# Patient Record
Sex: FEMALE | Race: WHITE | Employment: OTHER | URBAN - METROPOLITAN AREA
[De-identification: names, ages, dates, MRNs, and addresses within clinical notes are randomized per-mention and may not be internally consistent; named-entity substitution may affect disease eponyms.]

---

## 2022-04-29 ENCOUNTER — HOSPITAL ENCOUNTER (OUTPATIENT)
Dept: MAMMOGRAPHY | Age: 82
Discharge: HOME | End: 2022-04-29
Payer: MEDICARE

## 2022-04-29 VITALS — HEIGHT: 63 IN | WEIGHT: 169 LBS | BODY MASS INDEX: 29.95 KG/M2

## 2022-04-29 DIAGNOSIS — Z12.31 VISIT FOR SCREENING MAMMOGRAM: ICD-10-CM

## 2022-04-29 PROCEDURE — 77063 BREAST TOMOSYNTHESIS BI: CPT

## 2022-05-06 ENCOUNTER — APPOINTMENT (OUTPATIENT)
Dept: ULTRASOUND IMAGING | Age: 82
End: 2022-05-06
Payer: MEDICARE

## 2022-05-06 ENCOUNTER — HOSPITAL ENCOUNTER (OUTPATIENT)
Dept: MAMMOGRAPHY | Age: 82
Discharge: HOME | End: 2022-05-06
Payer: MEDICARE

## 2022-05-06 DIAGNOSIS — R92.8 ABNORMAL FINDING ON MAMMOGRAPHY: ICD-10-CM

## 2022-05-06 PROCEDURE — G0279 TOMOSYNTHESIS, MAMMO: HCPCS

## 2022-06-03 ENCOUNTER — HOSPITAL ENCOUNTER (OUTPATIENT)
Dept: MAMMOGRAPHY | Age: 82
Discharge: HOME | End: 2022-06-03
Payer: MEDICARE

## 2022-06-03 ENCOUNTER — HOSPITAL ENCOUNTER (OUTPATIENT)
Dept: ULTRASOUND IMAGING | Age: 82
Discharge: HOME | End: 2022-06-03
Payer: MEDICARE

## 2022-06-03 DIAGNOSIS — R92.8 ABNORMAL MAMMOGRAM: ICD-10-CM

## 2022-06-03 DIAGNOSIS — R92.8 ABNORMAL MAMMOGRAM OF RIGHT BREAST: ICD-10-CM

## 2022-06-03 PROCEDURE — 88360 TUMOR IMMUNOHISTOCHEM/MANUAL: CPT

## 2022-06-03 PROCEDURE — 77065 DX MAMMO INCL CAD UNI: CPT

## 2022-06-03 PROCEDURE — 76642 ULTRASOUND BREAST LIMITED: CPT

## 2022-06-03 PROCEDURE — 88341 IMHCHEM/IMCYTCHM EA ADD ANTB: CPT

## 2022-06-03 PROCEDURE — 88305 TISSUE EXAM BY PATHOLOGIST: CPT

## 2022-06-03 PROCEDURE — 88342 IMHCHEM/IMCYTCHM 1ST ANTB: CPT

## 2022-06-03 PROCEDURE — 2709999900 US BREAST BIOPSY W LOC DEVICE 1ST LESION RIGHT

## 2022-07-06 NOTE — PROGRESS NOTES
Place patient label inside box (if no patient label, complete below)  Name:  :  MR#:   Gita Delgado / PROCEDURE  1. I (we), Augusta Jaramillo (Patient Name) authorize Nikita Pedersen MD (Provider / Larry Dominguez) and/or such assistants as may be selected by him/her, to perform the following operation/procedure(s): RIGHT BREAST LUMPECTOMY/ RIGHT BREAST SENTINEL NODE BIOPSY       Note: If unable to obtain consent prior to an emergent procedure, document the emergent reason in the medical record. This procedure has been explained to my (our) satisfaction and included in the explanation was:  A) The intended benefit, nature, and extent of the procedure to be performed;  B) The significant risks involved and the probability of success;  C) Alternative procedures and methods of treatment;  D) The dangers and probable consequences of such alternatives (including no procedure or treatment); E) The expected consequences of the procedure on my future health;  F) Whether other qualified individuals would be performing important surgical tasks and/or whether  would be present to advise or support the procedure. I (we) understand that there are other risks of infection and other serious complications in the pre-operative/procedural and postoperative/procedural stages of my (our) care. I (we) have asked all of the questions which I (we) thought were important in deciding whether or not to undergo treatment or diagnosis. These questions have been answered to my (our) satisfaction. I (we) understand that no assurance can be given that the procedure will be a success, and no guarantee or warranty of success has been given to me (us).     2. It has been explained to me (us) that during the course of the operation/procedure, unforeseen conditions may be revealed that necessitate extension of the original procedure(s) or different procedure(s) than those set forth in Paragraph 1. I (we) authorize and request that the above-named physician, his/her assistants or his/her designees, perform procedures as necessary and desirable if deemed to be in my (our) best interest.     Revised 8/2/2021                                                                          Page 1 of 2                   3. I acknowledge that health care personnel may be observing this procedure for the purpose of medical education or other specified purposes as may be necessary as requested and/or approved by my (our) physician. 4. I (we) consent to the disposal by the hospital Pathologist of the removed tissue, parts or organs in accordance with hospital policy. 5. I do ____ do not ____ consent to the use of a local infiltration pain blocking agent that will be used by my provider/surgical provider to help alleviate pain during my procedure. 6. I do ____ do not ____ consent to an emergent blood transfusion in the case of a life-threatening situation that requires blood components to be administered. This consent is valid for 24 hours from the beginning of the procedure. 7. This patient does ____ or does not ____ currently have a DNR status/order. If DNR order is in place, obtain Addendum to the Surgical Consent for ALL Patients with a DNR Order to address michael-operative status for limited intervention or DNR suspension.      8. I have read and fully understand the above Consent for Operation/Procedure and that all blanks were completed before I signed the consent.   _____________________________       _____________________      ____/____am/pm  Signature of Patient or legal representative      Printed Name / Relationship            Date / Time   ____________________________       _____________________      ____/____am/pm  Witness to Signature                                    Printed Name                    Date / Time     If patient is unable to sign or is a minor, complete the following)  Patient is a minor, ____ years of age, or unable to sign because:   ______________________________________________________________________________________________    Bazan Hedger If a phone consent is obtained, consent will be documented by using two health care professionals, each affirming that the consenting party has no questions and gives consent for the procedure discussed with the physician/provider.   _____________________          ____________________       _____/_____am/pm   2nd witness to phone consent        Printed name           Date / Time    Informed Consent:  I have provided the explanation described above in section 1 to the patient and/or legal representative.  I have provided the patient and/or legal representative with an opportunity to ask any questions about the proposed operation/procedure.   ___________________________          ____________________         ____/____am/pm  Provider / Proceduralist                            Printed name            Date / Time  Revised 8/2/2021                                                                      Page 2 of 2

## 2022-07-07 NOTE — PROGRESS NOTES
body piercings), make-up, fingernail polish, lotion, powders or metal hairclips. 15. Contacts will need to be removed prior to surgery. You may want to bring your eye glasses to wear immediately before and after surgery. 14. Dentures will need to be removed before your procedure. 13. Bring cases for your glasses, contacts, dentures, or hearing aids to protect them while you are in surgery. 16. If you use a CPAP, please bring it with you on the day of your procedure. 17. Do not shave or wax for 72 hours prior to procedure near your operative site  18. FOR WOMAN OF CHILDBEARING AGE ONLY- please make sure we can collect a urine sample on arrival.     If you have further questions, you may contact your surgeon's office or us at [phone]     Left instructions on patient's voicemail.     Jennifer James RN.7/7/2022 .9:41 AM

## 2022-07-11 ENCOUNTER — ANESTHESIA EVENT (OUTPATIENT)
Dept: OPERATING ROOM | Age: 82
End: 2022-07-11
Payer: MEDICARE

## 2022-07-12 ENCOUNTER — HOSPITAL ENCOUNTER (OUTPATIENT)
Dept: NUCLEAR MEDICINE | Age: 82
Discharge: HOME | End: 2022-07-12
Payer: MEDICARE

## 2022-07-12 ENCOUNTER — HOSPITAL ENCOUNTER (OUTPATIENT)
Dept: MAMMOGRAPHY | Age: 82
Discharge: HOME | End: 2022-07-12
Payer: MEDICARE

## 2022-07-12 ENCOUNTER — HOSPITAL ENCOUNTER (OUTPATIENT)
Age: 82
Setting detail: OUTPATIENT SURGERY
Discharge: HOME | End: 2022-07-12
Attending: SURGERY | Admitting: SURGERY
Payer: MEDICARE

## 2022-07-12 ENCOUNTER — ANESTHESIA (OUTPATIENT)
Dept: OPERATING ROOM | Age: 82
End: 2022-07-12
Payer: MEDICARE

## 2022-07-12 VITALS
SYSTOLIC BLOOD PRESSURE: 156 MMHG | WEIGHT: 165 LBS | TEMPERATURE: 97 F | OXYGEN SATURATION: 94 % | BODY MASS INDEX: 29.23 KG/M2 | HEART RATE: 66 BPM | HEIGHT: 63 IN | RESPIRATION RATE: 14 BRPM | DIASTOLIC BLOOD PRESSURE: 76 MMHG

## 2022-07-12 DIAGNOSIS — R92.8 ABNORMAL MAMMOGRAM: ICD-10-CM

## 2022-07-12 DIAGNOSIS — C50.411 MALIGNANT NEOPLASM OF UPPER-OUTER QUADRANT OF RIGHT FEMALE BREAST, UNSPECIFIED ESTROGEN RECEPTOR STATUS (HCC): ICD-10-CM

## 2022-07-12 DIAGNOSIS — G89.18 ACUTE POST-OPERATIVE PAIN: Primary | ICD-10-CM

## 2022-07-12 LAB
GLUCOSE BLD-MCNC: 114 MG/DL (ref 70–99)
GLUCOSE BLD-MCNC: 136 MG/DL (ref 70–99)
PERFORMED ON: ABNORMAL
PERFORMED ON: ABNORMAL

## 2022-07-12 PROCEDURE — 2500000003 HC RX 250 WO HCPCS: Performed by: NURSE ANESTHETIST, CERTIFIED REGISTERED

## 2022-07-12 PROCEDURE — 6360000002 HC RX W HCPCS: Performed by: SURGERY

## 2022-07-12 PROCEDURE — A9520 TC99 TILMANOCEPT DIAG 0.5MCI: HCPCS | Performed by: SURGERY

## 2022-07-12 PROCEDURE — 88342 IMHCHEM/IMCYTCHM 1ST ANTB: CPT

## 2022-07-12 PROCEDURE — 3600000014 HC SURGERY LEVEL 4 ADDTL 15MIN: Performed by: SURGERY

## 2022-07-12 PROCEDURE — 76098 X-RAY EXAM SURGICAL SPECIMEN: CPT

## 2022-07-12 PROCEDURE — 7100000011 HC PHASE II RECOVERY - ADDTL 15 MIN: Performed by: SURGERY

## 2022-07-12 PROCEDURE — 3430000000 HC RX DIAGNOSTIC RADIOPHARMACEUTICAL: Performed by: SURGERY

## 2022-07-12 PROCEDURE — 88331 PATH CONSLTJ SURG 1 BLK 1SPC: CPT

## 2022-07-12 PROCEDURE — 6370000000 HC RX 637 (ALT 250 FOR IP): Performed by: ANESTHESIOLOGY

## 2022-07-12 PROCEDURE — 3430000000 HC RX DIAGNOSTIC RADIOPHARMACEUTICAL

## 2022-07-12 PROCEDURE — 6360000002 HC RX W HCPCS: Performed by: ANESTHESIOLOGY

## 2022-07-12 PROCEDURE — 78195 LYMPH SYSTEM IMAGING: CPT

## 2022-07-12 PROCEDURE — 2580000003 HC RX 258: Performed by: ANESTHESIOLOGY

## 2022-07-12 PROCEDURE — 6360000002 HC RX W HCPCS: Performed by: NURSE ANESTHETIST, CERTIFIED REGISTERED

## 2022-07-12 PROCEDURE — 7100000000 HC PACU RECOVERY - FIRST 15 MIN: Performed by: SURGERY

## 2022-07-12 PROCEDURE — 2580000003 HC RX 258: Performed by: SURGERY

## 2022-07-12 PROCEDURE — A9520 TC99 TILMANOCEPT DIAG 0.5MCI: HCPCS

## 2022-07-12 PROCEDURE — 88341 IMHCHEM/IMCYTCHM EA ADD ANTB: CPT

## 2022-07-12 PROCEDURE — 88307 TISSUE EXAM BY PATHOLOGIST: CPT

## 2022-07-12 PROCEDURE — 88360 TUMOR IMMUNOHISTOCHEM/MANUAL: CPT

## 2022-07-12 PROCEDURE — 2500000003 HC RX 250 WO HCPCS: Performed by: SURGERY

## 2022-07-12 PROCEDURE — 88305 TISSUE EXAM BY PATHOLOGIST: CPT

## 2022-07-12 PROCEDURE — 6370000000 HC RX 637 (ALT 250 FOR IP): Performed by: SURGERY

## 2022-07-12 PROCEDURE — A4217 STERILE WATER/SALINE, 500 ML: HCPCS | Performed by: SURGERY

## 2022-07-12 PROCEDURE — 7100000010 HC PHASE II RECOVERY - FIRST 15 MIN: Performed by: SURGERY

## 2022-07-12 PROCEDURE — 2709999900 HC NON-CHARGEABLE SUPPLY: Performed by: SURGERY

## 2022-07-12 PROCEDURE — 7100000001 HC PACU RECOVERY - ADDTL 15 MIN: Performed by: SURGERY

## 2022-07-12 PROCEDURE — 3700000000 HC ANESTHESIA ATTENDED CARE: Performed by: SURGERY

## 2022-07-12 PROCEDURE — 3600000004 HC SURGERY LEVEL 4 BASE: Performed by: SURGERY

## 2022-07-12 PROCEDURE — 3700000001 HC ADD 15 MINUTES (ANESTHESIA): Performed by: SURGERY

## 2022-07-12 RX ORDER — MAGNESIUM HYDROXIDE 1200 MG/15ML
LIQUID ORAL CONTINUOUS PRN
Status: DISCONTINUED | OUTPATIENT
Start: 2022-07-12 | End: 2022-07-12 | Stop reason: HOSPADM

## 2022-07-12 RX ORDER — SODIUM CHLORIDE 9 MG/ML
INJECTION, SOLUTION INTRAVENOUS PRN
Status: DISCONTINUED | OUTPATIENT
Start: 2022-07-12 | End: 2022-07-12 | Stop reason: HOSPADM

## 2022-07-12 RX ORDER — OXYCODONE HYDROCHLORIDE 5 MG/1
5 TABLET ORAL PRN
Status: COMPLETED | OUTPATIENT
Start: 2022-07-12 | End: 2022-07-12

## 2022-07-12 RX ORDER — FENTANYL CITRATE 50 UG/ML
50 INJECTION, SOLUTION INTRAMUSCULAR; INTRAVENOUS EVERY 5 MIN PRN
Status: DISCONTINUED | OUTPATIENT
Start: 2022-07-12 | End: 2022-07-12 | Stop reason: HOSPADM

## 2022-07-12 RX ORDER — ISOSULFAN BLUE 50 MG/5ML
INJECTION, SOLUTION SUBCUTANEOUS PRN
Status: DISCONTINUED | OUTPATIENT
Start: 2022-07-12 | End: 2022-07-12 | Stop reason: HOSPADM

## 2022-07-12 RX ORDER — CARVEDILOL 3.12 MG/1
3.12 TABLET ORAL 2 TIMES DAILY
COMMUNITY
Start: 2022-07-07

## 2022-07-12 RX ORDER — ONDANSETRON 2 MG/ML
INJECTION INTRAMUSCULAR; INTRAVENOUS PRN
Status: DISCONTINUED | OUTPATIENT
Start: 2022-07-12 | End: 2022-07-12 | Stop reason: SDUPTHER

## 2022-07-12 RX ORDER — MEPERIDINE HYDROCHLORIDE 25 MG/ML
6.25 INJECTION INTRAMUSCULAR; INTRAVENOUS; SUBCUTANEOUS ONCE
Status: COMPLETED | OUTPATIENT
Start: 2022-07-12 | End: 2022-07-12

## 2022-07-12 RX ORDER — LABETALOL HYDROCHLORIDE 5 MG/ML
10 INJECTION, SOLUTION INTRAVENOUS
Status: DISCONTINUED | OUTPATIENT
Start: 2022-07-12 | End: 2022-07-12 | Stop reason: HOSPADM

## 2022-07-12 RX ORDER — HYDROCODONE BITARTRATE AND ACETAMINOPHEN 5; 325 MG/1; MG/1
1 TABLET ORAL EVERY 6 HOURS PRN
Qty: 20 TABLET | Refills: 0 | Status: SHIPPED | OUTPATIENT
Start: 2022-07-12 | End: 2022-07-17

## 2022-07-12 RX ORDER — SODIUM CHLORIDE 0.9 % (FLUSH) 0.9 %
5-40 SYRINGE (ML) INJECTION PRN
Status: DISCONTINUED | OUTPATIENT
Start: 2022-07-12 | End: 2022-07-12 | Stop reason: HOSPADM

## 2022-07-12 RX ORDER — WARFARIN SODIUM 2 MG/1
2 TABLET ORAL DAILY
COMMUNITY
Start: 2022-07-07

## 2022-07-12 RX ORDER — LABETALOL HYDROCHLORIDE 5 MG/ML
INJECTION, SOLUTION INTRAVENOUS PRN
Status: DISCONTINUED | OUTPATIENT
Start: 2022-07-12 | End: 2022-07-12 | Stop reason: SDUPTHER

## 2022-07-12 RX ORDER — FENTANYL CITRATE 50 UG/ML
INJECTION, SOLUTION INTRAMUSCULAR; INTRAVENOUS PRN
Status: DISCONTINUED | OUTPATIENT
Start: 2022-07-12 | End: 2022-07-12 | Stop reason: SDUPTHER

## 2022-07-12 RX ORDER — AMIODARONE HYDROCHLORIDE 200 MG/1
200 TABLET ORAL DAILY
COMMUNITY

## 2022-07-12 RX ORDER — OXYCODONE HYDROCHLORIDE 5 MG/1
10 TABLET ORAL PRN
Status: COMPLETED | OUTPATIENT
Start: 2022-07-12 | End: 2022-07-12

## 2022-07-12 RX ORDER — SODIUM CHLORIDE 0.9 % (FLUSH) 0.9 %
5-40 SYRINGE (ML) INJECTION EVERY 12 HOURS SCHEDULED
Status: DISCONTINUED | OUTPATIENT
Start: 2022-07-12 | End: 2022-07-12 | Stop reason: HOSPADM

## 2022-07-12 RX ORDER — FUROSEMIDE 20 MG/1
20 TABLET ORAL DAILY
COMMUNITY
Start: 2022-07-07

## 2022-07-12 RX ORDER — GLYCOPYRROLATE 0.2 MG/ML
INJECTION INTRAMUSCULAR; INTRAVENOUS PRN
Status: DISCONTINUED | OUTPATIENT
Start: 2022-07-12 | End: 2022-07-12 | Stop reason: SDUPTHER

## 2022-07-12 RX ORDER — IPRATROPIUM BROMIDE AND ALBUTEROL SULFATE 2.5; .5 MG/3ML; MG/3ML
1 SOLUTION RESPIRATORY (INHALATION)
Status: DISCONTINUED | OUTPATIENT
Start: 2022-07-12 | End: 2022-07-12 | Stop reason: HOSPADM

## 2022-07-12 RX ORDER — LIDOCAINE HYDROCHLORIDE 20 MG/ML
INJECTION, SOLUTION EPIDURAL; INFILTRATION; INTRACAUDAL; PERINEURAL PRN
Status: DISCONTINUED | OUTPATIENT
Start: 2022-07-12 | End: 2022-07-12 | Stop reason: SDUPTHER

## 2022-07-12 RX ORDER — ONDANSETRON 2 MG/ML
4 INJECTION INTRAMUSCULAR; INTRAVENOUS
Status: DISCONTINUED | OUTPATIENT
Start: 2022-07-12 | End: 2022-07-12 | Stop reason: HOSPADM

## 2022-07-12 RX ORDER — SODIUM CHLORIDE 9 MG/ML
25 INJECTION, SOLUTION INTRAVENOUS PRN
Status: DISCONTINUED | OUTPATIENT
Start: 2022-07-12 | End: 2022-07-12 | Stop reason: HOSPADM

## 2022-07-12 RX ORDER — METOCLOPRAMIDE HYDROCHLORIDE 5 MG/ML
10 INJECTION INTRAMUSCULAR; INTRAVENOUS
Status: DISCONTINUED | OUTPATIENT
Start: 2022-07-12 | End: 2022-07-12 | Stop reason: HOSPADM

## 2022-07-12 RX ORDER — PROPOFOL 10 MG/ML
INJECTION, EMULSION INTRAVENOUS PRN
Status: DISCONTINUED | OUTPATIENT
Start: 2022-07-12 | End: 2022-07-12 | Stop reason: SDUPTHER

## 2022-07-12 RX ORDER — LOSARTAN POTASSIUM 50 MG/1
50 TABLET ORAL DAILY
COMMUNITY

## 2022-07-12 RX ORDER — ATORVASTATIN CALCIUM 10 MG/1
10 TABLET, FILM COATED ORAL DAILY
COMMUNITY
Start: 2022-06-21

## 2022-07-12 RX ORDER — SODIUM CHLORIDE, SODIUM LACTATE, POTASSIUM CHLORIDE, CALCIUM CHLORIDE 600; 310; 30; 20 MG/100ML; MG/100ML; MG/100ML; MG/100ML
INJECTION, SOLUTION INTRAVENOUS CONTINUOUS
Status: DISCONTINUED | OUTPATIENT
Start: 2022-07-12 | End: 2022-07-12 | Stop reason: HOSPADM

## 2022-07-12 RX ORDER — MEPERIDINE HYDROCHLORIDE 25 MG/ML
6.25 INJECTION INTRAMUSCULAR; INTRAVENOUS; SUBCUTANEOUS ONCE
Status: DISCONTINUED | OUTPATIENT
Start: 2022-07-12 | End: 2022-07-12 | Stop reason: HOSPADM

## 2022-07-12 RX ORDER — MIDAZOLAM HYDROCHLORIDE 1 MG/ML
INJECTION INTRAMUSCULAR; INTRAVENOUS PRN
Status: DISCONTINUED | OUTPATIENT
Start: 2022-07-12 | End: 2022-07-12 | Stop reason: SDUPTHER

## 2022-07-12 RX ADMIN — LABETALOL HYDROCHLORIDE 10 MG: 5 INJECTION, SOLUTION INTRAVENOUS at 11:19

## 2022-07-12 RX ADMIN — PHENYLEPHRINE HYDROCHLORIDE 300 MCG: 10 INJECTION, SOLUTION INTRAMUSCULAR; INTRAVENOUS; SUBCUTANEOUS at 10:10

## 2022-07-12 RX ADMIN — SODIUM CHLORIDE, POTASSIUM CHLORIDE, SODIUM LACTATE AND CALCIUM CHLORIDE: 600; 310; 30; 20 INJECTION, SOLUTION INTRAVENOUS at 08:58

## 2022-07-12 RX ADMIN — FENTANYL CITRATE 50 MCG: 50 INJECTION, SOLUTION INTRAMUSCULAR; INTRAVENOUS at 10:01

## 2022-07-12 RX ADMIN — PHENYLEPHRINE HYDROCHLORIDE 200 MCG: 10 INJECTION, SOLUTION INTRAMUSCULAR; INTRAVENOUS; SUBCUTANEOUS at 10:08

## 2022-07-12 RX ADMIN — FENTANYL CITRATE 50 MCG: 50 INJECTION, SOLUTION INTRAMUSCULAR; INTRAVENOUS at 11:04

## 2022-07-12 RX ADMIN — LIDOCAINE HYDROCHLORIDE 50 MG: 20 INJECTION, SOLUTION EPIDURAL; INFILTRATION; INTRACAUDAL; PERINEURAL at 10:01

## 2022-07-12 RX ADMIN — TILMANOCEPT 0.8 MILLICURIE: KIT at 10:18

## 2022-07-12 RX ADMIN — GLYCOPYRROLATE 0.2 MG: 0.2 INJECTION INTRAMUSCULAR; INTRAVENOUS at 10:08

## 2022-07-12 RX ADMIN — MIDAZOLAM HYDROCHLORIDE 1 MG: 2 INJECTION, SOLUTION INTRAMUSCULAR; INTRAVENOUS at 09:53

## 2022-07-12 RX ADMIN — MIDAZOLAM HYDROCHLORIDE 1 MG: 2 INJECTION, SOLUTION INTRAMUSCULAR; INTRAVENOUS at 09:58

## 2022-07-12 RX ADMIN — ONDANSETRON 4 MG: 2 INJECTION INTRAMUSCULAR; INTRAVENOUS at 11:37

## 2022-07-12 RX ADMIN — CEFAZOLIN 2000 MG: 2 INJECTION, POWDER, FOR SOLUTION INTRAMUSCULAR; INTRAVENOUS at 10:05

## 2022-07-12 RX ADMIN — OXYCODONE 5 MG: 5 TABLET ORAL at 14:15

## 2022-07-12 RX ADMIN — MEPERIDINE HYDROCHLORIDE 6.25 MG: 25 INJECTION INTRAMUSCULAR; INTRAVENOUS; SUBCUTANEOUS at 12:45

## 2022-07-12 RX ADMIN — PROPOFOL 100 MG: 10 INJECTION, EMULSION INTRAVENOUS at 10:01

## 2022-07-12 ASSESSMENT — PAIN DESCRIPTION - LOCATION: LOCATION: BREAST

## 2022-07-12 ASSESSMENT — PAIN SCALES - GENERAL
PAINLEVEL_OUTOF10: 0
PAINLEVEL_OUTOF10: 0
PAINLEVEL_OUTOF10: 5

## 2022-07-12 ASSESSMENT — PAIN - FUNCTIONAL ASSESSMENT: PAIN_FUNCTIONAL_ASSESSMENT: 0-10

## 2022-07-12 ASSESSMENT — PAIN DESCRIPTION - ORIENTATION: ORIENTATION: RIGHT

## 2022-07-12 ASSESSMENT — LIFESTYLE VARIABLES: SMOKING_STATUS: 0

## 2022-07-12 ASSESSMENT — PAIN DESCRIPTION - DESCRIPTORS: DESCRIPTORS: ACHING

## 2022-07-12 NOTE — PROGRESS NOTES
Patient admitted to PACU # 09 from OR at 1215 post RIGHT BREAST LUMPECTOMY/ RIGHT BREAST SENTINEL NODE BIOPSY - Right   per Dr. Robin Drake. Attached to PACU monitoring system and report received from anesthesia provider. Patient was reported to be hemodynamically stable during procedure. Patient drowsy on admission and denied pain. Pt R surgical incision with surgical glue and is c/d/i. Pt on simple mask at 6 L. Pt NSR on monitor. Blood glucose 114. Will continue to monitor.

## 2022-07-12 NOTE — PROGRESS NOTES
Ambulatory Surgery/Procedure Discharge Note    Vitals:    07/12/22 1339   BP: (!) 156/76   Pulse: 66   Resp: 14   Temp: 97 °F (36.1 °C)   SpO2: 94%       In: 1250 [I.V.:1200]  Out: -     Restroom use offered before discharge. yes    Pain assessment:  {Pain Level: 5    Pain pill given on discharge. Patient discharged to home/self care.  Patient discharged via wheel chair by transporter to waiting family/S.O.       7/12/2022 4:12 PM

## 2022-07-12 NOTE — PROGRESS NOTES
PACU Transfer to \[facility]\""    Vitals:    07/12/22 1310   BP: (!) 168/77   Pulse: 67   Resp: 18   Temp: 98.2 °F (36.8 °C)   SpO2: 92%         Intake/Output Summary (Last 24 hours) at 7/12/2022 1330  Last data filed at 7/12/2022 1206  Gross per 24 hour   Intake 1250 ml   Output --   Net 1250 ml       Pain assessment:  level of pain (1-10, 10 severe),   Pain Level: 0    Patient transferred to care of \[facility]\"" RN.    7/12/2022 1:30 PM         Patients spo2 92% on ear probe, patient states she tremors when she is nervous, which she states she is when in hospitals. Per  [facility], pt ok to discharge with systolic BP under 260.

## 2022-07-12 NOTE — BRIEF OP NOTE
Brief Postoperative Note      Patient: Mirella Samuel  YOB: 1940  MRN: [id number]    Date of Procedure: 7/12/2022    Pre-Op Diagnosis: Malignant neoplasm of upper-outer quadrant of right female breast, unspecified estrogen receptor status ([facility] [address] [C50.411]    Post-Op Diagnosis: Same       Procedure(s):  RIGHT BREAST LUMPECTOMY/ RIGHT BREAST SENTINEL NODE BIOPSY    Surgeon(s):  Ellender Apley, MD    Assistant:  Surgical Assistant: Jeana Sharma    Anesthesia: General    Estimated Blood Loss (mL): less than 50     Complications: None    Specimens:   ID Type Source Tests Collected by Time Destination   A : A) SENTINEL NODE #1 RIGHT AXILLA, BLUE, LEVEL 1 (09836) Tissue Tissue SURGICAL PATHOLOGY Ellender Apley, MD 7/12/2022 1044    B : B) RIGHT AXILLARY NODE Tissue Tissue SURGICAL PATHOLOGY Ellender Apley, MD 7/12/2022 1047    C : C) RIGHT BREAST MASS Tissue Tissue SURGICAL PATHOLOGY Ellender Apley, MD 7/12/2022 1114        Implants:  * No implants in log *      Drains:   Closed/Suction Drain Right Breast Bulb (Active)   Site Description Clean, dry & intact 07/12/22 1141   Dressing Status New dressing applied 07/12/22 1141   Drainage Appearance Bloody 07/12/22 1141   Drain Status Compressed 07/12/22 1141       Findings: sentinel node negative for malignancy, radiograph demonstrates pleomorphic calcifications, biopsy marker and grossly negative margins    Electronically signed by Ellender Apley, MD on 7/12/2022 at 12:20 PM

## 2022-07-12 NOTE — ANESTHESIA PRE PROCEDURE
Allergies:  No Known Allergies    Problem List:  There is no problem list on file for this patient. Past Medical History:  History reviewed. No pertinent past medical history. Past Surgical History:        Procedure Laterality Date    HYSTERECTOMY (CERVIX STATUS UNKNOWN)      US BREAST NEEDLE BIOPSY RIGHT Right 6/3/2022    US BREAST NEEDLE BIOPSY RIGHT 6/3/2022 Julio C Ray MD [facility] MOB ULTRASOUND       Social History:    Social History     Tobacco Use    Smoking status: Never Smoker    Smokeless tobacco: Never Used   Substance Use Topics    Alcohol use: Not on file                                Counseling given: Not Answered      Vital Signs (Current):   Vitals:    07/12/22 0815   BP: (!) 168/88   Pulse: 85   Resp: 14   Temp: 97.3 °F (36.3 °C)   TempSrc: Temporal   SpO2: 95%   Weight: 165 lb (74.8 kg)   Height: 5' 3\" (1.6 m)                                              BP Readings from Last 3 Encounters:   07/12/22 (!) 168/88       NPO Status:                                                                                 BMI:   Wt Readings from Last 3 Encounters:   07/12/22 165 lb (74.8 kg)   04/29/22 169 lb (76.7 kg)     Body mass index is 29.23 kg/m².     CBC:   Lab Results   Component Value Date/Time    WBC 4.8 09/09/2016 12:00 PM    RBC 4.70 09/09/2016 12:00 PM    HGB 13.1 09/09/2016 12:00 PM    HCT 38 09/09/2016 12:00 PM    MCV 81 09/09/2016 12:00 PM    RDW 13.7 09/09/2016 12:00 PM     09/09/2016 12:00 PM       CMP:   Lab Results   Component Value Date/Time     09/09/2016 12:00 PM    K 3.7 09/09/2016 12:00 PM     09/09/2016 12:00 PM    CO2 28 09/09/2016 12:00 PM    BUN 18 09/09/2016 12:00 PM    CREATININE 0.94 09/09/2016 12:00 PM    GFRAA >60 09/09/2016 12:00 PM    LABGLOM 58 09/09/2016 12:00 PM    GLUCOSE 132 09/09/2016 12:00 PM    PROT 7.3 09/09/2016 12:00 PM    CALCIUM 8.8 09/09/2016 12:00 PM    BILITOT 0.5 09/09/2016 12:00 PM    ALKPHOS 57 09/09/2016 12:00 PM AST 16 09/09/2016 12:00 PM    ALT 14 09/09/2016 12:00 PM       POC Tests:   Recent Labs     07/12/22  0837   POCGLU 136*       Coags: No results found for: PROTIME, INR, APTT    HCG (If Applicable): No results found for: PREGTESTUR, PREGSERUM, HCG, HCGQUANT     ABGs: No results found for: PHART, PO2ART, IVN7IDV, QRG4BJN, BEART, C1AUFDMF     Type & Screen (If Applicable):  No results found for: LABABO, LABRH    Drug/Infectious Status (If Applicable):  No results found for: HIV, HEPCAB    COVID-19 Screening (If Applicable): No results found for: COVID19        Anesthesia Evaluation    Airway: Mallampati: II  TM distance: >3 FB   Neck ROM: full  Mouth opening: > = 3 FB   Dental:          Pulmonary: breath sounds clear to auscultation      (-) COPD, asthma, sleep apnea and not a current smoker                           Cardiovascular:    (+) hypertension:, dysrhythmias: atrial fibrillation, hyperlipidemia    (-) past MI, CAD, CABG/stent and  angina    ECG reviewed  Rhythm: regular  Rate: normal  Echocardiogram reviewed         Beta Blocker:  Dose within 24 Hrs      ROS comment: 2021: There is mild mitral regurgitation. There is mild concentric left ventricular hypertrophy. The left atrium is dilated. The left ventricular function is moderately reduced. There is moderate global hypokinesis of the left ventricle. Overall left ventricular ejection fraction is estimated to be 30-35%. A contrast agent was used to demonstrate all left ventricular wall segments       Neuro/Psych:      (-) seizures, TIA and CVA           GI/Hepatic/Renal:        (-) GERD, liver disease and no renal disease       Endo/Other:    (+) DiabetesType II DM, , malignancy/cancer (DCIS). (-) hypothyroidism, hyperthyroidism               Abdominal:             Vascular:   + DVT (on coumadin, held x 1 week now, DVT many years ago ), .  - PE.       Other Findings:           Anesthesia Plan      general     ASA 3       Induction: intravenous. MIPS: Postoperative opioids intended and Prophylactic antiemetics administered. Anesthetic plan and risks discussed with patient. Plan discussed with CRNA.                     China Sierra MD   7/12/2022

## 2022-07-12 NOTE — FLOWSHEET NOTE
Dr. Viky Parada made aware of BP ok with BP, said to notify her if SBP is above 170. Pt was htn on arrival in [facility].

## 2022-07-12 NOTE — H&P
Kiah Garduno    [id number]    [facility] Same Day Surgery Update H & P  Department of General Surgery   Surgical Service   Pre-operative History and Physical  Last H & P within the last 30 days. 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 female who was undergoing screening mammogram when she was alerted to an abnormality in the right breast. She underwent additional imaging and ultimately core biopsy which demonstrated carcinoma and the patient presents today for the above procedure. Illness Screening: Patient denies fever, chills, worsening cough, or close contact with sick individuals. Past Medical History:    History reviewed. No pertinent past medical history. Past Surgical History:        Procedure Laterality Date    HYSTERECTOMY (CERVIX STATUS UNKNOWN)      US BREAST NEEDLE BIOPSY RIGHT Right 6/3/2022    US BREAST NEEDLE BIOPSY RIGHT 6/3/2022 Victoria Saab MD [facility] MOB ULTRASOUND       Medications Prior to Admission:      Prior to Admission medications    Medication Sig Start Date End Date Taking?  Authorizing Provider   atorvastatin (LIPITOR) 10 MG tablet Take 10 mg by mouth daily 6/21/22  Yes Historical Provider, MD   furosemide (LASIX) 20 MG tablet Take 20 mg by mouth daily 7/7/22  Yes Historical Provider, MD   carvedilol (COREG) 3.125 MG tablet Take 3.125 mg by mouth in the morning and at bedtime 7/7/22   Historical Provider, MD   amiodarone (CORDARONE) 200 MG tablet Take 200 mg by mouth daily 1/2 tab daily    Historical Provider, MD   losartan (COZAAR) 50 MG tablet Take 50 mg by mouth daily    Historical Provider, MD   metFORMIN (GLUCOPHAGE) 500 MG tablet Take 500 mg by mouth daily 7/7/22   Historical Provider, MD   linagliptin (TRADJENTA) 5 MG tablet Take 5 mg by mouth daily    Historical Provider, MD   warfarin (COUMADIN) 2 MG tablet Take 2 mg by mouth daily 7/7/22   Historical Provider, MD          Allergies:  Patient has no known allergies. PHYSICAL EXAM:      BP (!) 168/88   Pulse 85   Temp 97.3 °F (36.3 °C) (Temporal)   Resp 14   Ht 5' 3\" (1.6 m)   Wt 165 lb (74.8 kg)   SpO2 95%   BMI 29.23 kg/m²      Airway:  Airway patent with no audible stridor    Heart:  Regular rate and rhythm, No murmur noted    Lungs:  No increased work of breathing, good air exchange, clear to auscultation bilaterally, no crackles or wheezing    Abdomen:  Soft, non-distended, non-tender, no rebound tenderness or guarding, and no masses palpated    ASSESSMENT AND PLAN     Patient is a 78 y.o. female with above specified procedure planned. 1.  The patients history and physical was obtained and signed off by the pre-admission testing department. Patient seen and focused exam done today- no new changes since last physical exam on 6/28/22    2. Access to ancillary services are available per request of the provider.     LORI Ma - CNP     7/12/2022

## 2022-07-12 NOTE — ANESTHESIA POSTPROCEDURE EVALUATION
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     Mitra Phase I: Mitra Score: 10    Mitra Phase II:        Anesthesia Post Evaluation    Patient location during evaluation: PACU  Level of consciousness: awake and alert  Airway patency: patent  Nausea & Vomiting: no vomiting  Complications: no  Cardiovascular status: blood pressure returned to baseline  Respiratory status: acceptable  Hydration status: euvolemic  Multimodal analgesia pain management approach

## 2022-07-12 NOTE — FLOWSHEET NOTE
Pt c/o \"shivers\" pt denies feeling cold. Dr. Peace Valenzuela came to bedside and said to give 6.52 mg of demerol. Orders placed. Dr Peace Valenzuela encouraged pt to use IS. IS given to pt and educated on how to use.

## 2022-07-14 NOTE — OP NOTE
[address], [address]                                OPERATIVE REPORT    PATIENT NAME: Kevin Vaughan                    :        1940  MED REC NO:   [id number]                          ROOM:  ACCOUNT NO:   [de-identified]                           ADMIT DATE: 2022  PROVIDER:     Solis Hoyt MD    DATE OF PROCEDURE:  2022    PREOPERATIVE DIAGNOSIS:  Right breast cancer. POSTOPERATIVE DIAGNOSIS:  Right breast cancer. PROCEDURE:  Right lumpectomy with sentinel node biopsy. SURGEON:  Solis Hoyt MD    ANESTHESIA:  General.    BLOOD LOSS:  Less than 50 mL. ASSISTANT:   Pablo Baker    INDICATIONS:  The patient is an 78-year-old woman with a large invasive  carcinoma of the upper outer right breast.  The patient was counseled  regarding the treatment options and elected to proceed with breast  conservation. DESCRIPTION OF THE PROCEDURE:  The patient was taken to the operating  room, placed in the supine position. After induction of general  anesthesia, the patient underwent periareolar injection of Lymphoseek  and subareolar injection of isosulfan blue. We created a generous  axillary incision and carried the incision in a cranial direction  towards the axillary contents. We divided the subcutaneous tissue and  clavipectoral fascia to expose a blue level 1 node with a count of  13,522. Frozen section was negative for malignancy. We then identified  a second sentinel node in the specimen, but this was neither blue nor  radioactive. Scan of the axilla demonstrated background count of 111. We palpated no abnormal nodes nor did we appreciate any other blue or  radioactive nodes. At that point, we concluded the sentinel node  biopsy. We then dissected in a inferomedial direction creating a flap  over the mass.   We then dissected the mass off the pectoralis muscle  using electrocautery. Specimen was oriented with a MarginMap and  submitted for radiograph. The radiograph confirmed the presence of the  prior biopsy marker. The malignant calcifications appeared to be  contained within the specimen with a small grossly clear margin. We  placed a piece of Gelfoam in the cavity and then closed the incision in  two layers with interrupted deep dermal sutures followed by 4-0 Vicryl,  4-0 Monocryl subcuticular skin closure. A 15 round Sudhir Sero was placed  into the axilla prior to closing. This was placed through a lateral  approach and secured to the skin with a 2-0 silk suture. The incision  was sealed with Dermabond. A Biopatch was placed around the drain and  secured with a Tegaderm. The patient was taken to recovery in  satisfactory condition having tolerated procedure well.         David Denson MD    D: 07/14/2022 7:59:31       T: 07/14/2022 8:03:08     [id number]  Job#: [id number]     Doc#: [id number]    CC:

## 2022-08-16 NOTE — PROGRESS NOTES
Place patient label inside box (if no patient label, complete below)  Name:  :  MR#:   Israel McCracken / PROCEDURE  I (we) Paulo Davis (Patient Name) authorize Mark Chacko (Provider / Enrigue Blowers) and/or such assistants as may be selected by him/her, to perform the following operation/procedure(s): RE-EXCISION RIGHT BREAST LUMPECTOMY       Note: If unable to obtain consent prior to an emergent procedure, document the emergent reason in the medical record. This procedure has been explained to my (our) satisfaction and included in the explanation was: The intended benefit, nature, and extent of the procedure to be performed; The significant risks involved and the probability of success; Alternative procedures and methods of treatment; The dangers and probable consequences of such alternatives (including no procedure or treatment); The expected consequences of the procedure on my future health; Whether other qualified individuals would be performing important surgical tasks and/or whether  would be present to advise or support the procedure. I (we) understand that there are other risks of infection and other serious complications in the pre-operative/procedural and postoperative/procedural stages of my (our) care. I (we) have asked all of the questions which I (we) thought were important in deciding whether or not to undergo treatment or diagnosis. These questions have been answered to my (our) satisfaction. I (we) understand that no assurance can be given that the procedure will be a success, and no guarantee or warranty of success has been given to me (us). It has been explained to me (us) that during the course of the operation/procedure, unforeseen conditions may be revealed that necessitate extension of the original procedure(s) or different procedure(s) than those set forth in Paragraph 1.  I (we) authorize and request that the above-named physician, his/her assistants or his/her designees, perform procedures as necessary and desirable if deemed to be in my (our) best interest.     Revised 8/2/2021                                                                          Page 1 of 2         I acknowledge that health care personnel may be observing this procedure for the purpose of medical education or other specified purposes as may be necessary as requested and/or approved by my (our) physician. I (we) consent to the disposal by the hospital Pathologist of the removed tissue, parts or organs in accordance with hospital policy. I do ____ do not ____ consent to the use of a local infiltration pain blocking agent that will be used by my provider/surgical provider to help alleviate pain during my procedure. I do ____ do not ____ consent to an emergent blood transfusion in the case of a life-threatening situation that requires blood components to be administered. This consent is valid for 24 hours from the beginning of the procedure. This patient does ____ or does not ____ currently have a DNR status/order. If DNR order is in place, obtain Addendum to the Surgical Consent for ALL Patients with a DNR Order to address michael-operative status for limited intervention or DNR suspension.      I have read and fully understand the above Consent for Operation/Procedure and that all blanks were completed before I signed the consent.   _____________________________       _____________________      ____/____am/pm  Signature of Patient or legal representative      Printed Name / Relationship            Date / Time   ____________________________       _____________________      ____/____am/pm  Witness to Signature                                    Printed Name                    Date / Time    If patient is unable to sign or is a minor, complete the following)  Patient is a minor, ____ years of age, or unable to sign because: ______________________________________________________________________________________________    If a phone consent is obtained, consent will be documented by using two health care professionals, each affirming that the consenting party has no questions and gives consent for the procedure discussed with the physician/provider.   _____________________          ____________________       _____/_____am/pm   2nd witness to phone consent        Printed name           Date / Time    Informed Consent:  I have provided the explanation described above in section 1 to the patient and/or legal representative.  I have provided the patient and/or legal representative with an opportunity to ask any questions about the proposed operation/procedure.   ___________________________          ____________________         ____/____am/pm  Provider / Proceduralist                            Printed name            Date / Time  Revised 8/2/2021                                                                      Page 2 of 2

## 2022-08-16 NOTE — PROGRESS NOTES
8/16 [facility] at Dr Diana Moore, aware patient has not stopped coumadin and no antibx order.    Patient last dose of coumadin was 8/15, patient aware not to take until hears from office, patient also states will check with NP that comes to her home-mp

## 2022-08-16 NOTE — PROGRESS NOTES
[facility] PRE-SURGICAL TESTING INSTRUCTIONS                      PRIOR TO PROCEDURE DATE:    1. PLEASE FOLLOW ANY INSTRUCTIONS GIVEN TO YOU PER YOUR SURGEON. 2. Arrange for someone to drive you home and be with you for the first 24 hours after discharge for your safety after your procedure for which you received sedation. Ensure it is someone we can share information with regarding your discharge. NOTE: At this time ONLY 2 ADULTS may accompany you & everyone must be MASKED. 3. You must contact your surgeon for instructions IF:  You are taking any blood thinners, aspirin, anti-inflammatory or vitamins. There is a change in your physical condition such as a cold, fever, rash, cuts, sores, or any other infection, especially near your surgical site. 4. Do not drink alcohol the day before or day of your procedure. Do not use any recreational marijuana at least 24 hours or street drugs (heroin, cocaine) at minimum 5 days prior to your procedure. 5. A Pre-Surgical History and Physical MUST be completed WITHIN 30 DAYS OR LESS prior to your procedure. by your Physician or an Urgent Care        THE DAY OF YOUR PROCEDURE:  1. Follow instructions for ARRIVAL TIME as DIRECTED BY YOUR SURGEON. 2. Enter the MAIN entrance from [address] and follow the signs to the free Parking Garage or [employer] (offered free of charge 7 am-5pm). 3. Enter the Main Entrance of the hospital (do not enter from the lower level of the parking garage). Upon entrance, check in with the  at the surgical information desk on your LEFT. Bring your insurance card and photo ID to register      4. DO NOT EAT ANYTHING 8 hours prior to arrival for surgery. You may have up to 8 ounces of water 4 hours prior to your arrival for surgery.    NOTE: ALL Gastric, Bariatric & Bowel surgery patients - you MUST follow your surgeon's instructions regarding eating/ drinking as you will have very specific instructions to follow. If you did not receive these, call your surgeon's office immediately. 5. MEDICATIONS:  Take the following medications with a SMALL sip of water: COREG, AMIODARONE  Use your usual dose of inhalers the morning of surgery. BRING your rescue inhaler with you to hospital.   Anesthesia does NOT want you to take insulin the morning of surgery. They will control your blood sugar while you are at the hospital. Please contact your ordering physician for instructions regarding your insulin the night before your procedure. If you have an insulin pump, please keep it set on basal rate. Bariatric patient's call your surgeon if on diabetic medications as some may need to be stopped 1 week prior to surgery    6. Do not swallow additional water when brushing teeth. No gum, candy, mints, or ice chips. Refrain from smoking or at least decrease the amount on day of surgery. 7. Morning of surgery:   Take a shower with an antibacterial soap (i.e., Safeguard or Dial) OR your physician may have instructed you to use Hibiclens. Dress in loose, comfortable clothing appropriate for redressing after your procedure. Do not wear jewelry (including body piercings), make-up (especially NO eye make-up), fingernail polish (NO toenail polish if foot/leg surgery), lotion, powders, or metal hairclips. Do not shave or wax for 72 hours prior to procedure near your operative site. Shaving with a razor can irritate your skin and make it easier to develop an infection. On the day of your procedure, any hair that needs to be removed near the surgical site will be 'clipped' by a healthcare worker using a special clipper designed to avoid skin irritation. 8. Dentures, glasses, or contacts will need to be removed before your procedure. Bring cases for your glasses, contacts, dentures, or hearing aids to protect them while you are in surgery.       9. If you use a CPAP, please bring it with you on the day of your procedure. 10. We recommend that valuable personal belongings such as cash, cell phones, e-tablets, or jewelry, be left at home during your stay. The hospital will not be responsible for valuables that are not secured in the hospital safe. However, if your insurance requires a co-pay, you may want to bring a method of payment, i.e., Check or credit card, if you wish to pay your co-pay the day of surgery. 11. If you are to stay overnight, you may bring a bag with personal items. Please have any large items you may need brought in by your family after your arrival to your hospital room. 12. If you have a Living Will or Durable Power of , please bring a copy on the day of your procedure. How we keep you safe and work to prevent surgical site infections:   1. Health care workers should always check your ID bracelet to verify your name and birth date. You will be asked many times to state your name, date of birth, and allergies. 2. Health care workers should always clean their hands with soap or alcohol gel before providing care to you. It is okay to ask anyone if they cleaned their hands before they touch you. 3. You will be actively involved in verifying the type of procedure you are having and ensuring the correct surgical site. This will be confirmed multiple times prior to your procedure. Do NOT james your surgery site UNLESS instructed to by your surgeon. 4. When you are in the operating room, your surgical site will be cleansed with a special soap, and in most cases, you will be given an antibiotic before the surgery begins. What to expect AFTER your procedure? 1. Immediately following your procedure, your will be taken to the PACU for the first phase of your recovery. Your nurse will help you recover from any potential side effects of anesthesia, such as extreme drowsiness, changes in your vital signs or breathing patterns.  Nausea, headache, muscle aches, or sore throat may

## 2022-08-19 ENCOUNTER — HOSPITAL ENCOUNTER (OUTPATIENT)
Age: 82
Setting detail: OUTPATIENT SURGERY
Discharge: HOME | End: 2022-08-19
Attending: SURGERY | Admitting: SURGERY
Payer: MEDICARE

## 2022-08-19 ENCOUNTER — ANESTHESIA (OUTPATIENT)
Dept: OPERATING ROOM | Age: 82
End: 2022-08-19
Payer: MEDICARE

## 2022-08-19 ENCOUNTER — ANESTHESIA EVENT (OUTPATIENT)
Dept: OPERATING ROOM | Age: 82
End: 2022-08-19
Payer: MEDICARE

## 2022-08-19 VITALS
TEMPERATURE: 97.8 F | HEIGHT: 63 IN | DIASTOLIC BLOOD PRESSURE: 63 MMHG | OXYGEN SATURATION: 97 % | WEIGHT: 164.2 LBS | BODY MASS INDEX: 29.09 KG/M2 | SYSTOLIC BLOOD PRESSURE: 148 MMHG | RESPIRATION RATE: 18 BRPM | HEART RATE: 73 BPM

## 2022-08-19 DIAGNOSIS — C50.411 MALIGNANT NEOPLASM OF UPPER-OUTER QUADRANT OF RIGHT FEMALE BREAST, UNSPECIFIED ESTROGEN RECEPTOR STATUS (HCC): ICD-10-CM

## 2022-08-19 DIAGNOSIS — G89.18 ACUTE POST-OPERATIVE PAIN: Primary | ICD-10-CM

## 2022-08-19 LAB
A/G RATIO: 1.5 (ref 1.1–2.2)
ALBUMIN SERPL-MCNC: 3.4 G/DL (ref 3.4–5)
ALP BLD-CCNC: 66 U/L (ref 40–129)
ALT SERPL-CCNC: 9 U/L (ref 10–40)
ANION GAP SERPL CALCULATED.3IONS-SCNC: 9 MMOL/L (ref 3–16)
AST SERPL-CCNC: 10 U/L (ref 15–37)
BILIRUB SERPL-MCNC: 0.3 MG/DL (ref 0–1)
BUN BLDV-MCNC: 22 MG/DL (ref 7–20)
CALCIUM SERPL-MCNC: 8.6 MG/DL (ref 8.3–10.6)
CHLORIDE BLD-SCNC: 103 MMOL/L (ref 99–110)
CO2: 28 MMOL/L (ref 21–32)
CREAT SERPL-MCNC: 1.2 MG/DL (ref 0.6–1.2)
GFR AFRICAN AMERICAN: 52
GFR NON-AFRICAN AMERICAN: 43
GLUCOSE BLD-MCNC: 164 MG/DL (ref 70–99)
GLUCOSE BLD-MCNC: 169 MG/DL (ref 70–99)
GLUCOSE BLD-MCNC: 171 MG/DL (ref 70–99)
GLUCOSE BLD-MCNC: 212 MG/DL (ref 70–99)
HCT VFR BLD CALC: 30.9 % (ref 36–48)
HEMOGLOBIN: 9.8 G/DL (ref 12–16)
MCH RBC QN AUTO: 24.2 PG (ref 26–34)
MCHC RBC AUTO-ENTMCNC: 31.8 G/DL (ref 31–36)
MCV RBC AUTO: 76.1 FL (ref 80–100)
PDW BLD-RTO: 18.7 % (ref 12.4–15.4)
PERFORMED ON: ABNORMAL
PLATELET # BLD: 204 K/UL (ref 135–450)
PMV BLD AUTO: 9.1 FL (ref 5–10.5)
POTASSIUM SERPL-SCNC: 3.7 MMOL/L (ref 3.5–5.1)
RBC # BLD: 4.06 M/UL (ref 4–5.2)
SODIUM BLD-SCNC: 140 MMOL/L (ref 136–145)
TOTAL PROTEIN: 5.6 G/DL (ref 6.4–8.2)
WBC # BLD: 6 K/UL (ref 4–11)

## 2022-08-19 PROCEDURE — 3700000001 HC ADD 15 MINUTES (ANESTHESIA): Performed by: SURGERY

## 2022-08-19 PROCEDURE — 6360000002 HC RX W HCPCS

## 2022-08-19 PROCEDURE — 2709999900 HC NON-CHARGEABLE SUPPLY: Performed by: SURGERY

## 2022-08-19 PROCEDURE — A4217 STERILE WATER/SALINE, 500 ML: HCPCS | Performed by: SURGERY

## 2022-08-19 PROCEDURE — 85027 COMPLETE CBC AUTOMATED: CPT

## 2022-08-19 PROCEDURE — 7100000001 HC PACU RECOVERY - ADDTL 15 MIN: Performed by: SURGERY

## 2022-08-19 PROCEDURE — 3600000014 HC SURGERY LEVEL 4 ADDTL 15MIN: Performed by: SURGERY

## 2022-08-19 PROCEDURE — 7100000000 HC PACU RECOVERY - FIRST 15 MIN: Performed by: SURGERY

## 2022-08-19 PROCEDURE — 7100000011 HC PHASE II RECOVERY - ADDTL 15 MIN: Performed by: SURGERY

## 2022-08-19 PROCEDURE — 80053 COMPREHEN METABOLIC PANEL: CPT

## 2022-08-19 PROCEDURE — 2580000003 HC RX 258: Performed by: SURGERY

## 2022-08-19 PROCEDURE — 88305 TISSUE EXAM BY PATHOLOGIST: CPT

## 2022-08-19 PROCEDURE — 6360000002 HC RX W HCPCS: Performed by: ANESTHESIOLOGY

## 2022-08-19 PROCEDURE — 3700000000 HC ANESTHESIA ATTENDED CARE: Performed by: SURGERY

## 2022-08-19 PROCEDURE — 3600000004 HC SURGERY LEVEL 4 BASE: Performed by: SURGERY

## 2022-08-19 PROCEDURE — 2500000003 HC RX 250 WO HCPCS: Performed by: SURGERY

## 2022-08-19 PROCEDURE — 7100000010 HC PHASE II RECOVERY - FIRST 15 MIN: Performed by: SURGERY

## 2022-08-19 PROCEDURE — 2580000003 HC RX 258

## 2022-08-19 RX ORDER — KETOROLAC TROMETHAMINE 30 MG/ML
INJECTION, SOLUTION INTRAMUSCULAR; INTRAVENOUS PRN
Status: DISCONTINUED | OUTPATIENT
Start: 2022-08-19 | End: 2022-08-19 | Stop reason: SDUPTHER

## 2022-08-19 RX ORDER — HYDROCODONE BITARTRATE AND ACETAMINOPHEN 5; 325 MG/1; MG/1
1 TABLET ORAL EVERY 6 HOURS PRN
Qty: 20 TABLET | Refills: 0 | Status: SHIPPED | OUTPATIENT
Start: 2022-08-19 | End: 2022-08-24

## 2022-08-19 RX ORDER — ONDANSETRON 2 MG/ML
4 INJECTION INTRAMUSCULAR; INTRAVENOUS
Status: DISCONTINUED | OUTPATIENT
Start: 2022-08-19 | End: 2022-08-19 | Stop reason: HOSPADM

## 2022-08-19 RX ORDER — SODIUM CHLORIDE, SODIUM LACTATE, POTASSIUM CHLORIDE, CALCIUM CHLORIDE 600; 310; 30; 20 MG/100ML; MG/100ML; MG/100ML; MG/100ML
INJECTION, SOLUTION INTRAVENOUS CONTINUOUS
Status: DISCONTINUED | OUTPATIENT
Start: 2022-08-19 | End: 2022-08-19 | Stop reason: HOSPADM

## 2022-08-19 RX ORDER — MEPERIDINE HYDROCHLORIDE 25 MG/ML
12.5 INJECTION INTRAMUSCULAR; INTRAVENOUS; SUBCUTANEOUS EVERY 5 MIN PRN
Status: DISCONTINUED | OUTPATIENT
Start: 2022-08-19 | End: 2022-08-19 | Stop reason: HOSPADM

## 2022-08-19 RX ORDER — HYDRALAZINE HYDROCHLORIDE 20 MG/ML
INJECTION INTRAMUSCULAR; INTRAVENOUS PRN
Status: DISCONTINUED | OUTPATIENT
Start: 2022-08-19 | End: 2022-08-19 | Stop reason: SDUPTHER

## 2022-08-19 RX ORDER — SODIUM CHLORIDE 0.9 % (FLUSH) 0.9 %
5-40 SYRINGE (ML) INJECTION PRN
Status: DISCONTINUED | OUTPATIENT
Start: 2022-08-19 | End: 2022-08-19 | Stop reason: HOSPADM

## 2022-08-19 RX ORDER — PROPOFOL 10 MG/ML
INJECTION, EMULSION INTRAVENOUS PRN
Status: DISCONTINUED | OUTPATIENT
Start: 2022-08-19 | End: 2022-08-19 | Stop reason: SDUPTHER

## 2022-08-19 RX ORDER — LIDOCAINE HYDROCHLORIDE 20 MG/ML
INJECTION, SOLUTION INTRAVENOUS PRN
Status: DISCONTINUED | OUTPATIENT
Start: 2022-08-19 | End: 2022-08-19 | Stop reason: SDUPTHER

## 2022-08-19 RX ORDER — ONDANSETRON 2 MG/ML
INJECTION INTRAMUSCULAR; INTRAVENOUS PRN
Status: DISCONTINUED | OUTPATIENT
Start: 2022-08-19 | End: 2022-08-19 | Stop reason: SDUPTHER

## 2022-08-19 RX ORDER — SODIUM CHLORIDE 9 MG/ML
INJECTION, SOLUTION INTRAVENOUS PRN
Status: DISCONTINUED | OUTPATIENT
Start: 2022-08-19 | End: 2022-08-19 | Stop reason: HOSPADM

## 2022-08-19 RX ORDER — FENTANYL CITRATE 50 UG/ML
25 INJECTION, SOLUTION INTRAMUSCULAR; INTRAVENOUS EVERY 5 MIN PRN
Status: DISCONTINUED | OUTPATIENT
Start: 2022-08-19 | End: 2022-08-19 | Stop reason: HOSPADM

## 2022-08-19 RX ORDER — HYDRALAZINE HYDROCHLORIDE 20 MG/ML
10 INJECTION INTRAMUSCULAR; INTRAVENOUS
Status: DISCONTINUED | OUTPATIENT
Start: 2022-08-19 | End: 2022-08-19 | Stop reason: HOSPADM

## 2022-08-19 RX ORDER — PROPOFOL 10 MG/ML
INJECTION, EMULSION INTRAVENOUS CONTINUOUS PRN
Status: DISCONTINUED | OUTPATIENT
Start: 2022-08-19 | End: 2022-08-19 | Stop reason: SDUPTHER

## 2022-08-19 RX ORDER — PROCHLORPERAZINE EDISYLATE 5 MG/ML
5 INJECTION INTRAMUSCULAR; INTRAVENOUS
Status: DISCONTINUED | OUTPATIENT
Start: 2022-08-19 | End: 2022-08-19 | Stop reason: HOSPADM

## 2022-08-19 RX ORDER — LIDOCAINE HYDROCHLORIDE 10 MG/ML
1 INJECTION, SOLUTION EPIDURAL; INFILTRATION; INTRACAUDAL; PERINEURAL
Status: DISCONTINUED | OUTPATIENT
Start: 2022-08-19 | End: 2022-08-19 | Stop reason: HOSPADM

## 2022-08-19 RX ORDER — SODIUM CHLORIDE 0.9 % (FLUSH) 0.9 %
5-40 SYRINGE (ML) INJECTION EVERY 12 HOURS SCHEDULED
Status: DISCONTINUED | OUTPATIENT
Start: 2022-08-19 | End: 2022-08-19 | Stop reason: HOSPADM

## 2022-08-19 RX ORDER — DEXAMETHASONE SODIUM PHOSPHATE 4 MG/ML
INJECTION, SOLUTION INTRA-ARTICULAR; INTRALESIONAL; INTRAMUSCULAR; INTRAVENOUS; SOFT TISSUE PRN
Status: DISCONTINUED | OUTPATIENT
Start: 2022-08-19 | End: 2022-08-19 | Stop reason: SDUPTHER

## 2022-08-19 RX ORDER — FENTANYL CITRATE 50 UG/ML
INJECTION, SOLUTION INTRAMUSCULAR; INTRAVENOUS PRN
Status: DISCONTINUED | OUTPATIENT
Start: 2022-08-19 | End: 2022-08-19 | Stop reason: SDUPTHER

## 2022-08-19 RX ORDER — SODIUM CHLORIDE, SODIUM LACTATE, POTASSIUM CHLORIDE, CALCIUM CHLORIDE 600; 310; 30; 20 MG/100ML; MG/100ML; MG/100ML; MG/100ML
INJECTION, SOLUTION INTRAVENOUS CONTINUOUS PRN
Status: DISCONTINUED | OUTPATIENT
Start: 2022-08-19 | End: 2022-08-19 | Stop reason: SDUPTHER

## 2022-08-19 RX ORDER — LABETALOL HYDROCHLORIDE 5 MG/ML
10 INJECTION, SOLUTION INTRAVENOUS
Status: DISCONTINUED | OUTPATIENT
Start: 2022-08-19 | End: 2022-08-19 | Stop reason: HOSPADM

## 2022-08-19 RX ORDER — MAGNESIUM HYDROXIDE 1200 MG/15ML
LIQUID ORAL CONTINUOUS PRN
Status: DISCONTINUED | OUTPATIENT
Start: 2022-08-19 | End: 2022-08-19 | Stop reason: HOSPADM

## 2022-08-19 RX ADMIN — HYDRALAZINE HYDROCHLORIDE 10 MG: 20 INJECTION INTRAMUSCULAR; INTRAVENOUS at 12:04

## 2022-08-19 RX ADMIN — FENTANYL CITRATE 25 MCG: 50 INJECTION, SOLUTION INTRAMUSCULAR; INTRAVENOUS at 10:47

## 2022-08-19 RX ADMIN — DEXAMETHASONE SODIUM PHOSPHATE 8 MG: 4 INJECTION, SOLUTION INTRAMUSCULAR; INTRAVENOUS at 11:24

## 2022-08-19 RX ADMIN — FENTANYL CITRATE 50 MCG: 50 INJECTION, SOLUTION INTRAMUSCULAR; INTRAVENOUS at 10:16

## 2022-08-19 RX ADMIN — LIDOCAINE HYDROCHLORIDE 100 MG: 20 INJECTION, SOLUTION INTRAVENOUS at 10:30

## 2022-08-19 RX ADMIN — HYDRALAZINE HYDROCHLORIDE 10 MG: 20 INJECTION INTRAMUSCULAR; INTRAVENOUS at 10:28

## 2022-08-19 RX ADMIN — FENTANYL CITRATE 25 MCG: 50 INJECTION, SOLUTION INTRAMUSCULAR; INTRAVENOUS at 10:32

## 2022-08-19 RX ADMIN — ONDANSETRON 4 MG: 2 INJECTION INTRAMUSCULAR; INTRAVENOUS at 11:24

## 2022-08-19 RX ADMIN — PROPOFOL 100 MCG/KG/MIN: 10 INJECTION, EMULSION INTRAVENOUS at 10:30

## 2022-08-19 RX ADMIN — SODIUM CHLORIDE, SODIUM LACTATE, POTASSIUM CHLORIDE, AND CALCIUM CHLORIDE: .6; .31; .03; .02 INJECTION, SOLUTION INTRAVENOUS at 11:25

## 2022-08-19 RX ADMIN — PROPOFOL 40 MG: 10 INJECTION, EMULSION INTRAVENOUS at 10:30

## 2022-08-19 RX ADMIN — KETOROLAC TROMETHAMINE 15 MG: 30 INJECTION, SOLUTION INTRAMUSCULAR at 11:24

## 2022-08-19 ASSESSMENT — PAIN SCALES - GENERAL
PAINLEVEL_OUTOF10: 0

## 2022-08-19 ASSESSMENT — LIFESTYLE VARIABLES: SMOKING_STATUS: 0

## 2022-08-19 NOTE — H&P
Provider, MD   atorvastatin (LIPITOR) 10 MG tablet Take 10 mg by mouth daily 6/21/22   Historical Provider, MD   furosemide (LASIX) 20 MG tablet Take 20 mg by mouth daily 7/7/22   Historical Provider, MD   losartan (COZAAR) 50 MG tablet Take 50 mg by mouth daily    Historical Provider, MD   metFORMIN (GLUCOPHAGE) 500 MG tablet Take 500 mg by mouth daily 7/7/22   Historical Provider, MD   linagliptin (TRADJENTA) 5 MG tablet Take 5 mg by mouth daily    Historical Provider, MD   warfarin (COUMADIN) 2 MG tablet Take 2 mg by mouth daily 7/7/22   Historical Provider, MD       Allergies:  Atorvastatin    REVIEW OF SYSTEMS:    Constitutional: Negative for chills, fatigue and fever   HENT: Negative for loss of hearing   Eyes: Negative for visual disturbance  Respiratory: Negative for shortness of breath and wheezing    Cardiovascular: Negative for chest pain, palpitations and exertional chest pressure  Gastrointestinal: Negative for constipation, diarrhea, nausea and vomiting   Musculoskeletal: Negative for gait problem, and joint swelling    Skin: Negative for rash and nonhealing wounds   Neurological: Negative for dizziness, syncope, light-headedness    Hematological: Does  bruise/bleed easily (on coumadin)  Psychiatric/Behavioral: Negative for agitation    PHYSICAL EXAM:      Pulse 54   Temp 97.1 °F (36.2 °C) (Temporal)   Resp 16   Ht 5' 3\" (1.6 m)   Wt 164 lb (74.4 kg)   SpO2 100%   BMI 29.05 kg/m²  I      Eyes:  pupils equal, round and reactive to light and conjunctiva normal    Head/ENT:  Normocephalic, without obvious abnormality, atramatic,     Neck:  Supple, symmetrical, trachea midline, no adenopathy, no tenderness, skin warm and dry.     Heart: Irregular rhythm     Lungs:  No increased work of breathing, good air exchange, clear to auscultation bilaterally, no crackles or wheezing    Abdomen:  Soft, non-distended, non-tender, no masses palpated    Extremities:  No clubbing, cyanosis, or edema      ASSESSMENT AND PLAN:    1. Patient is a 78 y.o. female with above specified procedure planned. 2.  Access to ancillary services are available per request of the provider.     LORI Daugherty - KRISH   8/19/2022

## 2022-08-19 NOTE — PROGRESS NOTES
PACU Transfer to [facility]    Vitals:    08/19/22 1210   BP: (!) 153/56   Pulse:    Resp:    Temp: 97.1 °F (36.2 °C)   SpO2: 94%       No intake or output data in the 24 hours ending 08/19/22 1218    Pain assessment:  level of pain (1-10, 10 severe),        Patient transferred to care of [facility] RN.    8/19/2022 12:18 PM

## 2022-08-19 NOTE — OP NOTE
Operative Note      Patient: Jovanna Gordon  YOB: 1940  MRN: [id number]    Date of Procedure: 8/19/2022    Pre-Op Diagnosis: Malignant neoplasm of upper-outer quadrant of right female breast, unspecified estrogen receptor status ([facility] [address] [C50.411]    Post-Op Diagnosis: Same       Procedure(s):  RE-EXCISION RIGHT BREAST LUMPECTOMY    Surgeon(s):  Yaneth Hardwick MD    Assistant:   Surgical Assistant: Breann Tejeda; [facility]    Anesthesia: Monitor Anesthesia Care    Estimated Blood Loss (mL): less than 50     Complications: None    Specimens:   ID Type Source Tests Collected by Time Destination   A : ANTERIOR MARGIN RIGHT BREAST Tissue Tissue SURGICAL PATHOLOGY Yaneth Hardwick MD 8/19/2022 1045    B : MEDIAL MARGIN RIGHT LUMPECTOMY Tissue Tissue SURGICAL PATHOLOGY Yaneth Hardwick MD 8/19/2022 1046    C : LATERAL MARGIN RIGHT LUMPECTOMY Tissue Tissue SURGICAL PATHOLOGY Yaneth Hardwick MD 8/19/2022 1058        Implants: none    Findings: lumpectomy cavity with seroma    Indications: The patient is an 24-year-old woman who underwent a right lumpectomy. At that time, she had margins which were widely clear for invasive ductal carcinoma. 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 The patient has declined any adjuvant therapy and reexcision was recommended for wider margins to lower the risk of local recurrence. Detailed Description of Procedure: The patient was taken to the operating room and placed in the supine position. After administration of IV sedation, the right breast and axilla were prepped and draped in the usual sterile fashion. We opened the axillary incision and expressed a moderate amount of seroma. We then proceeded with reexcision of the anterior margin,medial margin and lateral margin. The true margins were identified with a double suture.   After inspecting the site for hemostasis, bleeding points were controlled using electrocautery. We applied Surgicel powder to the cavity and then irrigated several times. The incision was closed in 3 layers with interrupted subcutaneous sutures followed by interrupted deep dermal sutures and finally a 4-0 Monocryl subcuticular skin closure. The wound was sealed with Dermabond the patient was taken to recovery in satisfactory condition having tolerated procedure well.     Electronically signed by Linda Rae MD on 8/19/2022 at 1:47 PM

## 2022-08-19 NOTE — PROGRESS NOTES
Ambulatory Surgery/Procedure Discharge Note    Vitals:    08/19/22 1233   BP: 97/68   Pulse: 77   Resp: 18   Temp: 97.8 °F (36.6 °C)   SpO2: 99%       No intake/output data recorded. Restroom use offered before discharge. Yes    Pain assessment:  none     C/o \"shaking ' Blood sugar 212 had regular pepsi at home SB/P low but states she took her coreg today  Family arrived in room, states they spoke with  And are up-dated  1250pm reviewed D/c instructions with pt and family all verbalized their understanding. Feels much better warmer  less shaking(I\" always have tremors\")  0183 d/c iv    Up to assist with dressing  1313  Patient discharged to home/self care.  Patient discharged via wheel chair by transporter to waiting family/S.O.       8/19/2022

## 2022-08-19 NOTE — DISCHARGE INSTRUCTIONS
Wear bra 24 to 36 hours for comfort  Ice to surgical site on 10 minutes every hour until bedtime today  No strenuous activity or lifting >10 pounds for 1 week  Okay to shower   [facility]       [address] [facility]    There are potential side effects of anesthesia or sedation you may experience for the first 24 hours. These side effects include:    Confusion or Memory loss, Dizziness, or Delayed Reaction Times   [x]A responsible person should be with you for the next 24 hours. Do not operate any vehicles (automobiles, bicycles, motorcycles) or power tools or machinery for 24 hours. Do not sign any legal documents or make any legal decisions for 24 hours. Do not drink alcohol for 24 hours or while taking narcotic pain medication. Nausea    [x]Start with light diet and progress to your normal diet as you feel like eating. However, if you experience nausea or repeated episodes of vomiting which persist beyond 12-24 hours, notify your physician. Once nausea has passed, remember to keep drinking fluids. Difficulty Passing Urine  [x]Drink extra amounts of fluid today. Notify your physician if you have not urinated within 8 hours after your procedure or you feel uncomfortable. Irritated Throat from a Breathing Tube  [x]Drink extra amounts of fluid today. Lozenges may help. Muscle Aches  [x]You may experience some generalized body aches as your muscles recover from medications used to relax them during surgery. These will gradually subside. MEDICATION INSTRUCTIONS:  []Prescription(S) x     sent with you. Use as directed. When taking pain medications, you may experience the side effect of dizziness or drowsiness. Do not drink alcohol or drive when taking these medications. []Prescription(S) x          Called to Pharmacy Name and location:    [x]Give the list of your medications to your primary care physician on your next visit.  Keep your med list updated and carry it with in case of emergencies. [] Narcotic pain medications can cause the side effect of significant constipation. You may want to add a stool softener to your postoperative medication schedule or speak to your surgeon on how best to manage this side effect. NARCOTIC SAFETY:  Your pain medicine is only for you to take. Safely store your medicines. Store pills up high and out of reach of children and pets. Ensure safety caps are snapped tightly  Keep track of how many pills you have left    Unused medication can be disposed of by taking them to a drop-off box or take-back program that is authorized by the [facility]. Access to a site near you can be found on the [facility] [employer] [facility] website ([address] [facility]). If you have a CPAP machine, it is very important that you use it daily during all periods of sleep and daytime rest during your recovery at home. Surgery and Anesthesia place a significant amount of stress on your body. Using your CPAP will help keep you safe and lessen the negative effects of that stress. FOLLOW-UP RECOVERY CARE:  [x]Call the office at [phone] for follow-up appointment and problems    Watch for these possible complications, symptoms, or side effects of anesthesia. Call physician if they or any other problems occur:  Signs of INFECTION   > Fever over 101°     > Redness, swelling, hardness or warmth at the operative site   >Foul smelling or cloudy drainage at the operative site   Unrelieved PAIN  Unrelieved NAUSEA  Blood soaked dressing. (Some oozing may be normal)  Inability to urinate      Numb, pale, blue, cold or tingling extremity      Physician:  Jayde Crocker    The above instructions were reviewed with patient/significant other.   The following additional patient specific information was reviewed with the patient/significant other:  [x]Procedure/physician specific instructions  []Medication information sheet(S) including

## 2022-08-19 NOTE — ANESTHESIA PRE PROCEDURE
Department of Anesthesiology  Preprocedure Note       Name:  Zakiya Plummer   Age:  78 y.o.  :  1940                                          MRN:  [id number]         Date:  2022      Surgeon: Lonna Frankel):  Donnie Reilly MD    Procedure: Procedure(s):  RE-EXCISION RIGHT BREAST LUMPECTOMY    Medications prior to admission:   Prior to Admission medications    Medication Sig Start Date End Date Taking? Authorizing Provider   carvedilol (COREG) 3.125 MG tablet Take 3.125 mg by mouth in the morning and at bedtime 22   Historical Provider, MD   amiodarone (CORDARONE) 200 MG tablet Take 200 mg by mouth daily 1/2 tab daily    Historical Provider, MD   atorvastatin (LIPITOR) 10 MG tablet Take 10 mg by mouth daily 22   Historical Provider, MD   furosemide (LASIX) 20 MG tablet Take 20 mg by mouth daily 22   Historical Provider, MD   losartan (COZAAR) 50 MG tablet Take 50 mg by mouth daily    Historical Provider, MD   metFORMIN (GLUCOPHAGE) 500 MG tablet Take 500 mg by mouth daily 22   Historical Provider, MD   linagliptin (TRADJENTA) 5 MG tablet Take 5 mg by mouth daily    Historical Provider, MD   warfarin (COUMADIN) 2 MG tablet Take 2 mg by mouth daily 22   Historical Provider, MD       Current medications:    No current outpatient medications on file. No current facility-administered medications for this visit. Allergies:  No Known Allergies    Problem List:  There is no problem list on file for this patient.       Past Medical History:        Diagnosis Date    Cancer [facility]) 2022    right breast    COVID-19 2021    Diabetes mellitus ([facility] [address]     Hx of blood clots     in legs- on Coumadin    Hyperlipidemia     Hypertension     Nervous breakdown       - Dec 21    Pneumothorax     during surgery - in        Past Surgical History:        Procedure Laterality Date    BREAST BIOPSY Right 2022    RIGHT BREAST LUMPECTOMY/ RIGHT BREAST SENTINEL NODE BIOPSY performed by Yolanda Gongora MD at [facility] 1721      cataract with IOL    HYSTERECTOMY ([address])      US BREAST NEEDLE BIOPSY RIGHT Right 6/3/2022    US BREAST NEEDLE BIOPSY RIGHT 6/3/2022 Jimenez Bustamante MD [facility] ULTRASOUND       Social History:    Social History     Tobacco Use    Smoking status: Never    Smokeless tobacco: Never   Substance Use Topics    Alcohol use: Not Currently                                Counseling given: Not Answered      Vital Signs (Current): There were no vitals filed for this visit. BP Readings from Last 3 Encounters:   07/12/22 (!) 156/76       NPO Status:                                                                                 BMI:   Wt Readings from Last 3 Encounters:   08/16/22 164 lb (74.4 kg)   07/12/22 165 lb (74.8 kg)   04/29/22 169 lb (76.7 kg)     There is no height or weight on file to calculate BMI.    CBC:   Lab Results   Component Value Date/Time    WBC 4.8 09/09/2016 12:00 PM    RBC 4.70 09/09/2016 12:00 PM    HGB 13.1 09/09/2016 12:00 PM    HCT 38 09/09/2016 12:00 PM    MCV 81 09/09/2016 12:00 PM    RDW 13.7 09/09/2016 12:00 PM     09/09/2016 12:00 PM       CMP:   Lab Results   Component Value Date/Time     09/09/2016 12:00 PM    K 3.7 09/09/2016 12:00 PM     09/09/2016 12:00 PM    CO2 28 09/09/2016 12:00 PM    BUN 18 09/09/2016 12:00 PM    CREATININE 0.94 09/09/2016 12:00 PM    GFRAA >60 09/09/2016 12:00 PM    LABGLOM 58 09/09/2016 12:00 PM    GLUCOSE 132 09/09/2016 12:00 PM    PROT 7.3 09/09/2016 12:00 PM    CALCIUM 8.8 09/09/2016 12:00 PM    BILITOT 0.5 09/09/2016 12:00 PM    ALKPHOS 57 09/09/2016 12:00 PM    AST 16 09/09/2016 12:00 PM    ALT 14 09/09/2016 12:00 PM       POC Tests:   No results for input(s): POCGLU, POCNA, POCK, POCCL, POCBUN, POCHEMO, POCHCT in the last 72 hours.     Coags: No results found for: PROTIME, INR, APTT    HCG (If Applicable): No results found for: PREGTESTUR, PREGSERUM, HCG, HCGQUANT     ABGs: No results found for: PHART, PO2ART, EMC3KOQ, LMU0YTV, BEART, R9APYMZZ     Type & Screen (If Applicable):  No results found for: LABABO, LABRH    Drug/Infectious Status (If Applicable):  No results found for: HIV, HEPCAB    COVID-19 Screening (If Applicable): No results found for: COVID19        Anesthesia Evaluation  Patient summary reviewed and Nursing notes reviewed no history of anesthetic complications:   Airway: Mallampati: II  TM distance: >3 FB   Neck ROM: full  Mouth opening: > = 3 FB   Dental:          Pulmonary:Negative Pulmonary ROS breath sounds clear to auscultation      (-) COPD, asthma, sleep apnea and not a current smoker                           Cardiovascular:    (+) hypertension:, dysrhythmias: atrial fibrillation, hyperlipidemia    (-) past MI, CAD, CABG/stent and  angina    ECG reviewed  Rhythm: regular  Rate: normal  Echocardiogram reviewed         Beta Blocker:  Dose within 24 Hrs      ROS comment: 2021: There is mild mitral regurgitation. There is mild concentric left ventricular hypertrophy. The left atrium is dilated. The left ventricular function is moderately reduced. There is moderate global hypokinesis of the left ventricle. Overall left ventricular ejection fraction is estimated to be 30-35%. A contrast agent was used to demonstrate all left ventricular wall segments       Neuro/Psych:   (+) psychiatric history:   (-) seizures, TIA and CVA           GI/Hepatic/Renal: Neg GI/Hepatic/Renal ROS       (-) GERD, liver disease and no renal disease       Endo/Other:    (+) DiabetesType II DM, , malignancy/cancer (DCIS). (-) hypothyroidism, hyperthyroidism               Abdominal:             Vascular:   + DVT (on coumadin, held x 1 week now, DVT many years ago ), .  - PE. Other Findings:             Anesthesia Plan      MAC     ASA 3       Induction: intravenous.     MIPS: Postoperative opioids intended and Prophylactic antiemetics administered. Anesthetic plan and risks discussed with patient. Plan discussed with CRNA.     Attending anesthesiologist reviewed and agrees with Preprocedure content                Taj Herrera DO   8/19/2022

## 2022-08-19 NOTE — ANESTHESIA POSTPROCEDURE EVALUATION
Department of Anesthesiology  Postprocedure Note    Patient: Jovanna Gordon  MRN: [id number]  YOB: 1940  Date of evaluation: 8/19/2022      Procedure Summary     Date: 08/19/22 Room / Location: [address] [place] [address] [address] / [facility]    Anesthesia Start: 9985 Anesthesia Stop: 1130    Procedure: RE-EXCISION RIGHT BREAST LUMPECTOMY (Right) Diagnosis:       Malignant neoplasm of upper-outer quadrant of right female breast, unspecified estrogen receptor status ([address]      (Malignant neoplasm of upper-outer quadrant of right female breast, unspecified estrogen receptor status ([facility] [address] Margrett Closs)    Surgeons: Sowmya Bullock MD Responsible Provider: Dom Saenz DO    Anesthesia Type: MAC ASA Status: 3          Anesthesia Type: No value filed.     Mitra Phase I: Mitra Score: 10    Mitra Phase II:        Anesthesia Post Evaluation    Patient location during evaluation: PACU  Patient participation: complete - patient participated  Level of consciousness: awake  Pain score: 2  Airway patency: patent  Nausea & Vomiting: no nausea and no vomiting  Complications: no  Cardiovascular status: hemodynamically stable  Respiratory status: acceptable  Hydration status: stable  Multimodal analgesia pain management approach

## 2022-08-19 NOTE — PROGRESS NOTES
Patient to pacu 10 s/p RE-EXCISION RIGHT BREAST LUMPECTOMY - Right, report received from Dr Johanna Romero, reported hypertensive intra op.  All vitals stable upon arrival. Patient denies pain at this moment

## 2023-02-16 ENCOUNTER — HOSPITAL ENCOUNTER (OUTPATIENT)
Dept: MAMMOGRAPHY | Age: 83
Discharge: HOME | End: 2023-02-16
Payer: MEDICARE

## 2023-02-16 VITALS — BODY MASS INDEX: 29.06 KG/M2 | HEIGHT: 63 IN | WEIGHT: 164 LBS

## 2023-02-16 DIAGNOSIS — Z85.3 PERSONAL HISTORY OF BREAST CANCER: ICD-10-CM

## 2023-02-16 PROCEDURE — G0279 TOMOSYNTHESIS, MAMMO: HCPCS

## 2023-02-16 PROCEDURE — 77065 DX MAMMO INCL CAD UNI: CPT

## 2024-05-24 ENCOUNTER — APPOINTMENT (OUTPATIENT)
Age: 84
DRG: 522 | End: 2024-05-24
Payer: MEDICARE

## 2024-05-24 ENCOUNTER — HOSPITAL ENCOUNTER (INPATIENT)
Age: 84
LOS: 5 days | Discharge: NURSING, REHAB OR LONG-TERM CARE | DRG: 522 | End: 2024-05-29
Attending: EMERGENCY MEDICINE | Admitting: HOSPITALIST
Payer: MEDICARE

## 2024-05-24 DIAGNOSIS — S72.001A CLOSED FRACTURE OF RIGHT HIP, INITIAL ENCOUNTER (HCC): ICD-10-CM

## 2024-05-24 DIAGNOSIS — S72.001A CLOSED FRACTURE OF RIGHT HIP REQUIRING OPERATIVE REPAIR, INITIAL ENCOUNTER (HCC): ICD-10-CM

## 2024-05-24 DIAGNOSIS — I48.19 PERSISTENT ATRIAL FIBRILLATION (HCC): Primary | ICD-10-CM

## 2024-05-24 PROBLEM — N18.30 STAGE 3 CHRONIC KIDNEY DISEASE (HCC): Status: ACTIVE | Noted: 2019-10-20

## 2024-05-24 PROBLEM — M62.81 MUSCLE WEAKNESS (GENERALIZED): Status: ACTIVE | Noted: 2021-12-10

## 2024-05-24 PROBLEM — E11.29 TYPE 2 DIABETES MELLITUS WITH KIDNEY COMPLICATION, WITHOUT LONG-TERM CURRENT USE OF INSULIN (HCC): Status: ACTIVE | Noted: 2019-01-22

## 2024-05-24 PROBLEM — E87.1 HYPONATREMIA: Status: ACTIVE | Noted: 2021-12-05

## 2024-05-24 PROBLEM — I49.3 PVCS (PREMATURE VENTRICULAR CONTRACTIONS): Chronic | Status: ACTIVE | Noted: 2017-01-30

## 2024-05-24 PROBLEM — I50.42 CHRONIC COMBINED SYSTOLIC AND DIASTOLIC HF (HEART FAILURE), NYHA CLASS 2 (HCC): Chronic | Status: ACTIVE | Noted: 2017-03-02

## 2024-05-24 PROBLEM — K21.9 GASTROESOPHAGEAL REFLUX DISEASE: Status: ACTIVE | Noted: 2021-12-10

## 2024-05-24 PROBLEM — H61.23 BILATERAL IMPACTED CERUMEN: Status: ACTIVE | Noted: 2022-01-11

## 2024-05-24 PROBLEM — E03.9 ACQUIRED HYPOTHYROIDISM: Status: ACTIVE | Noted: 2023-10-10

## 2024-05-24 PROBLEM — R09.02 HYPOXEMIA: Status: ACTIVE | Noted: 2021-12-05

## 2024-05-24 PROBLEM — R26.2 DIFFICULTY IN WALKING, NOT ELSEWHERE CLASSIFIED: Status: ACTIVE | Noted: 2021-12-10
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
## 2024-05-24 PROBLEM — E11.29 TYPE 2 DIABETES MELLITUS WITH KIDNEY COMPLICATION, WITHOUT LONG-TERM CURRENT USE OF INSULIN (HCC): Chronic | Status: ACTIVE | Noted: 2019-01-22

## 2024-05-24 PROBLEM — I49.3 PVCS (PREMATURE VENTRICULAR CONTRACTIONS): Status: ACTIVE | Noted: 2017-01-30

## 2024-05-24 PROBLEM — R39.11 URINARY HESITANCY: Status: ACTIVE | Noted: 2020-06-23

## 2024-05-24 PROBLEM — N18.30 STAGE 3 CHRONIC KIDNEY DISEASE (HCC): Chronic | Status: ACTIVE | Noted: 2019-10-20

## 2024-05-24 PROBLEM — D50.9 IRON DEFICIENCY ANEMIA: Status: ACTIVE | Noted: 2021-04-20

## 2024-05-24 PROBLEM — R94.31 PROLONGED QT INTERVAL: Status: ACTIVE | Noted: 2021-12-05

## 2024-05-24 PROBLEM — F41.8 MIXED ANXIETY AND DEPRESSIVE DISORDER: Status: ACTIVE | Noted: 2022-07-26

## 2024-05-24 PROBLEM — I50.42 CHRONIC COMBINED SYSTOLIC AND DIASTOLIC HF (HEART FAILURE), NYHA CLASS 2 (HCC): Status: ACTIVE | Noted: 2017-03-02

## 2024-05-24 PROBLEM — K63.5 COLON POLYPS: Status: ACTIVE | Noted: 2024-05-24

## 2024-05-24 PROBLEM — E83.42 HYPOMAGNESEMIA: Status: ACTIVE | Noted: 2021-12-05

## 2024-05-24 PROBLEM — N18.31 STAGE 3A CHRONIC KIDNEY DISEASE (HCC): Status: ACTIVE | Noted: 2019-10-20

## 2024-05-24 PROBLEM — R55 SYNCOPE: Status: ACTIVE | Noted: 2021-12-05

## 2024-05-24 PROBLEM — Z86.16 HISTORY OF SEVERE ACUTE RESPIRATORY SYNDROME CORONAVIRUS 2 (SARS-COV-2) DISEASE: Status: ACTIVE | Noted: 2022-01-11

## 2024-05-24 PROBLEM — E87.6 HYPOKALEMIA: Status: ACTIVE | Noted: 2020-03-16

## 2024-05-24 PROBLEM — C50.919 MALIGNANT TUMOR OF BREAST (HCC): Status: ACTIVE | Noted: 2022-06-09

## 2024-05-24 PROBLEM — E78.2 MIXED HYPERLIPIDEMIA: Status: ACTIVE | Noted: 2022-01-11

## 2024-05-24 PROBLEM — J10.1 INFLUENZA A H1N1 INFECTION: Status: ACTIVE | Noted: 2020-03-17

## 2024-05-24 PROBLEM — E53.8 VITAMIN B12 DEFICIENCY (NON ANEMIC): Status: ACTIVE | Noted: 2023-10-10

## 2024-05-24 PROBLEM — Z85.3 HISTORY OF MALIGNANT NEOPLASM OF RIGHT BREAST: Status: ACTIVE | Noted: 2024-05-24

## 2024-05-24 PROBLEM — E55.9 VITAMIN D DEFICIENCY: Status: ACTIVE | Noted: 2020-06-22
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
## 2024-05-24 PROBLEM — I48.92 PAROXYSMAL ATRIAL FLUTTER (HCC): Status: ACTIVE | Noted: 2019-01-21

## 2024-05-24 PROBLEM — I48.92 PAROXYSMAL ATRIAL FLUTTER (HCC): Chronic | Status: ACTIVE | Noted: 2019-01-21

## 2024-05-24 PROBLEM — S72.141A CLOSED FRACTURE OF FEMUR, INTERTROCHANTERIC, RIGHT, INITIAL ENCOUNTER (HCC): Status: ACTIVE | Noted: 2024-05-24

## 2024-05-24 LAB
ALBUMIN SERPL-MCNC: 3.3 G/DL (ref 3.4–5)
ALBUMIN/GLOB SERPL: 1.1 {RATIO}
ALP SERPL-CCNC: 63 U/L (ref 40–129)
ALT SERPL-CCNC: 8 U/L (ref 10–40)
ANION GAP SERPL CALCULATED.3IONS-SCNC: 11 MMOL/L (ref 3–16)
AST SERPL-CCNC: 20 U/L (ref 15–37)
BASOPHILS # BLD: 0.03 K/UL (ref 0–0.2)
BASOPHILS NFR BLD: 1 %
BILIRUB SERPL-MCNC: <0.2 MG/DL (ref 0–1)
BUN SERPL-MCNC: 21 MG/DL (ref 7–20)
CALCIUM SERPL-MCNC: 8.7 MG/DL (ref 8.3–10.6)
CHLORIDE SERPL-SCNC: 105 MMOL/L (ref 99–110)
CO2 SERPL-SCNC: 25 MMOL/L (ref 21–32)
CREAT SERPL-MCNC: 1.1 MG/DL (ref 0.6–1.2)
EKG ATRIAL RATE: 69 BPM
EKG DIAGNOSIS: NORMAL
EKG P AXIS: 62 DEGREES
EKG P-R INTERVAL: 182 MS
EKG Q-T INTERVAL: 424 MS
EKG QRS DURATION: 114 MS
EKG QTC CALCULATION (BAZETT): 454 MS
EKG R AXIS: -30 DEGREES
EKG T AXIS: 66 DEGREES
EKG VENTRICULAR RATE: 69 BPM
EOSINOPHIL # BLD: 0.01 K/UL (ref 0–0.6)
EOSINOPHILS RELATIVE PERCENT: 0 %
ERYTHROCYTE [DISTWIDTH] IN BLOOD BY AUTOMATED COUNT: 19.3 % (ref 12.4–15.4)
GFR, ESTIMATED: 50 ML/MIN/1.73M2
GLUCOSE BLD-MCNC: 149 MG/DL (ref 70–99)
GLUCOSE SERPL-MCNC: 143 MG/DL (ref 70–99)
HCT VFR BLD AUTO: 29.2 % (ref 36–48)
HGB BLD-MCNC: 9.1 G/DL (ref 12–16)
IMM GRANULOCYTES # BLD AUTO: 0.05 K/UL (ref 0–0.5)
IMM GRANULOCYTES NFR BLD: 1 %
INR PPP: 1.7 (ref 0.9–1.2)
LYMPHOCYTES NFR BLD: 1.56 K/UL (ref 1–5.1)
LYMPHOCYTES RELATIVE PERCENT: 33 %
MCH RBC QN AUTO: 23.9 PG (ref 26–34)
MCHC RBC AUTO-ENTMCNC: 31.2 G/DL (ref 31–36)
MCV RBC AUTO: 76.8 FL (ref 80–100)
MONOCYTES NFR BLD: 0.77 K/UL (ref 0–1.3)
MONOCYTES NFR BLD: 16 %
NEUTROPHILS NFR BLD: 49 %
NEUTS SEG NFR BLD: 2.32 K/UL (ref 1.7–7.7)
PLATELET # BLD AUTO: 125 K/UL (ref 135–450)
PLATELET CONFIRMATION: NORMAL
PMV BLD AUTO: 11.3 FL
POTASSIUM SERPL-SCNC: 4.4 MMOL/L (ref 3.5–5.1)
PROT SERPL-MCNC: 6.4 G/DL (ref 6.4–8.2)
PROTHROMBIN TIME: 19.7 SEC (ref 11.9–14.9)
RBC # BLD AUTO: 3.8 M/UL (ref 4–5.2)
SODIUM SERPL-SCNC: 141 MMOL/L (ref 136–145)
WBC OTHER # BLD: 4.7 K/UL (ref 4–11)

## 2024-05-24 PROCEDURE — 85025 COMPLETE CBC W/AUTO DIFF WBC: CPT

## 2024-05-24 PROCEDURE — 2060000000 HC ICU INTERMEDIATE R&B

## 2024-05-24 PROCEDURE — 71045 X-RAY EXAM CHEST 1 VIEW: CPT

## 2024-05-24 PROCEDURE — 96374 THER/PROPH/DIAG INJ IV PUSH: CPT

## 2024-05-24 PROCEDURE — 99223 1ST HOSP IP/OBS HIGH 75: CPT | Performed by: HOSPITALIST

## 2024-05-24 PROCEDURE — 96375 TX/PRO/DX INJ NEW DRUG ADDON: CPT

## 2024-05-24 PROCEDURE — 83540 ASSAY OF IRON: CPT

## 2024-05-24 PROCEDURE — 96376 TX/PRO/DX INJ SAME DRUG ADON: CPT

## 2024-05-24 PROCEDURE — 82962 GLUCOSE BLOOD TEST: CPT

## 2024-05-24 PROCEDURE — 82728 ASSAY OF FERRITIN: CPT

## 2024-05-24 PROCEDURE — 6360000002 HC RX W HCPCS: Performed by: HOSPITALIST

## 2024-05-24 PROCEDURE — 73502 X-RAY EXAM HIP UNI 2-3 VIEWS: CPT

## 2024-05-24 PROCEDURE — 80053 COMPREHEN METABOLIC PANEL: CPT

## 2024-05-24 PROCEDURE — 85610 PROTHROMBIN TIME: CPT

## 2024-05-24 PROCEDURE — 83550 IRON BINDING TEST: CPT

## 2024-05-24 PROCEDURE — 2580000003 HC RX 258: Performed by: HOSPITALIST

## 2024-05-24 PROCEDURE — 99285 EMERGENCY DEPT VISIT HI MDM: CPT

## 2024-05-24 PROCEDURE — 6360000002 HC RX W HCPCS: Performed by: EMERGENCY MEDICINE

## 2024-05-24 RX ORDER — LANOLIN ALCOHOL/MO/W.PET/CERES
3 CREAM (GRAM) TOPICAL NIGHTLY PRN
Status: DISCONTINUED | OUTPATIENT
Start: 2024-05-24 | End: 2024-05-29 | Stop reason: HOSPADM

## 2024-05-24 RX ORDER — MAGNESIUM SULFATE IN WATER 40 MG/ML
2000 INJECTION, SOLUTION INTRAVENOUS PRN
Status: DISCONTINUED | OUTPATIENT
Start: 2024-05-24 | End: 2024-05-29 | Stop reason: HOSPADM

## 2024-05-24 RX ORDER — MORPHINE SULFATE 2 MG/ML
2 INJECTION, SOLUTION INTRAMUSCULAR; INTRAVENOUS
Status: DISCONTINUED | OUTPATIENT
Start: 2024-05-24 | End: 2024-05-27

## 2024-05-24 RX ORDER — SODIUM CHLORIDE 9 MG/ML
INJECTION, SOLUTION INTRAVENOUS PRN
Status: DISCONTINUED | OUTPATIENT
Start: 2024-05-24 | End: 2024-05-29 | Stop reason: HOSPADM

## 2024-05-24 RX ORDER — GLUCAGON 1 MG/ML
1 KIT INJECTION PRN
Status: DISCONTINUED | OUTPATIENT
Start: 2024-05-24 | End: 2024-05-29 | Stop reason: HOSPADM

## 2024-05-24 RX ORDER — SODIUM CHLORIDE 0.9 % (FLUSH) 0.9 %
5-40 SYRINGE (ML) INJECTION EVERY 12 HOURS SCHEDULED
Status: DISCONTINUED | OUTPATIENT
Start: 2024-05-24 | End: 2024-05-29 | Stop reason: HOSPADM

## 2024-05-24 RX ORDER — MORPHINE SULFATE 4 MG/ML
4 INJECTION, SOLUTION INTRAMUSCULAR; INTRAVENOUS
Status: DISCONTINUED | OUTPATIENT
Start: 2024-05-24 | End: 2024-05-24 | Stop reason: ALTCHOICE

## 2024-05-24 RX ORDER — INSULIN LISPRO 100 [IU]/ML
0-4 INJECTION, SOLUTION INTRAVENOUS; SUBCUTANEOUS
Status: DISCONTINUED | OUTPATIENT
Start: 2024-05-25 | End: 2024-05-25

## 2024-05-24 RX ORDER — ONDANSETRON 4 MG/1
4 TABLET, ORALLY DISINTEGRATING ORAL EVERY 8 HOURS PRN
Status: DISCONTINUED | OUTPATIENT
Start: 2024-05-24 | End: 2024-05-29 | Stop reason: HOSPADM

## 2024-05-24 RX ORDER — PHYTONADIONE 10 MG/ML
5 INJECTION, EMULSION INTRAMUSCULAR; INTRAVENOUS; SUBCUTANEOUS ONCE
Status: COMPLETED | OUTPATIENT
Start: 2024-05-24 | End: 2024-05-24

## 2024-05-24 RX ORDER — SENNOSIDES A AND B 8.6 MG/1
1 TABLET, FILM COATED ORAL DAILY PRN
Status: DISCONTINUED | OUTPATIENT
Start: 2024-05-24 | End: 2024-05-29 | Stop reason: HOSPADM

## 2024-05-24 RX ORDER — INSULIN LISPRO 100 [IU]/ML
0-4 INJECTION, SOLUTION INTRAVENOUS; SUBCUTANEOUS
Status: DISCONTINUED | OUTPATIENT
Start: 2024-05-25 | End: 2024-05-29 | Stop reason: HOSPADM

## 2024-05-24 RX ORDER — ACETAMINOPHEN 325 MG/1
650 TABLET ORAL EVERY 6 HOURS PRN
Status: DISCONTINUED | OUTPATIENT
Start: 2024-05-24 | End: 2024-05-29 | Stop reason: HOSPADM

## 2024-05-24 RX ORDER — ORPHENADRINE CITRATE 30 MG/ML
60 INJECTION INTRAMUSCULAR; INTRAVENOUS ONCE
Status: COMPLETED | OUTPATIENT
Start: 2024-05-24 | End: 2024-05-24

## 2024-05-24 RX ORDER — ONDANSETRON 2 MG/ML
4 INJECTION INTRAMUSCULAR; INTRAVENOUS
Status: COMPLETED | OUTPATIENT
Start: 2024-05-24 | End: 2024-05-24

## 2024-05-24 RX ORDER — POTASSIUM CHLORIDE 20 MEQ/1
40 TABLET, EXTENDED RELEASE ORAL PRN
Status: DISCONTINUED | OUTPATIENT
Start: 2024-05-24 | End: 2024-05-29 | Stop reason: HOSPADM

## 2024-05-24 RX ORDER — POTASSIUM CHLORIDE 7.45 MG/ML
10 INJECTION INTRAVENOUS PRN
Status: DISCONTINUED | OUTPATIENT
Start: 2024-05-24 | End: 2024-05-29 | Stop reason: HOSPADM

## 2024-05-24 RX ORDER — ONDANSETRON 2 MG/ML
4 INJECTION INTRAMUSCULAR; INTRAVENOUS EVERY 6 HOURS PRN
Status: DISCONTINUED | OUTPATIENT
Start: 2024-05-24 | End: 2024-05-29 | Stop reason: HOSPADM

## 2024-05-24 RX ORDER — INSULIN LISPRO 100 [IU]/ML
0-4 INJECTION, SOLUTION INTRAVENOUS; SUBCUTANEOUS NIGHTLY
Status: DISCONTINUED | OUTPATIENT
Start: 2024-05-24 | End: 2024-05-29 | Stop reason: HOSPADM

## 2024-05-24 RX ORDER — ACETAMINOPHEN 650 MG/1
650 SUPPOSITORY RECTAL EVERY 6 HOURS PRN
Status: DISCONTINUED | OUTPATIENT
Start: 2024-05-24 | End: 2024-05-29 | Stop reason: HOSPADM

## 2024-05-24 RX ORDER — SODIUM CHLORIDE 0.9 % (FLUSH) 0.9 %
5-40 SYRINGE (ML) INJECTION PRN
Status: DISCONTINUED | OUTPATIENT
Start: 2024-05-24 | End: 2024-05-29 | Stop reason: HOSPADM

## 2024-05-24 RX ORDER — DEXTROSE MONOHYDRATE 100 MG/ML
INJECTION, SOLUTION INTRAVENOUS CONTINUOUS PRN
Status: DISCONTINUED | OUTPATIENT
Start: 2024-05-24 | End: 2024-05-29 | Stop reason: HOSPADM

## 2024-05-24 RX ORDER — AMIODARONE HYDROCHLORIDE 200 MG/1
200 TABLET ORAL DAILY
Status: DISCONTINUED | OUTPATIENT
Start: 2024-05-25 | End: 2024-05-29 | Stop reason: HOSPADM

## 2024-05-24 RX ORDER — MAGNESIUM HYDROXIDE/ALUMINUM HYDROXICE/SIMETHICONE 120; 1200; 1200 MG/30ML; MG/30ML; MG/30ML
30 SUSPENSION ORAL EVERY 6 HOURS PRN
Status: DISCONTINUED | OUTPATIENT
Start: 2024-05-24 | End: 2024-05-29 | Stop reason: HOSPADM

## 2024-05-24 RX ADMIN — ONDANSETRON 4 MG: 2 INJECTION INTRAMUSCULAR; INTRAVENOUS at 18:47

## 2024-05-24 RX ADMIN — PHYTONADIONE 5 MG: 10 INJECTION, EMULSION INTRAMUSCULAR; INTRAVENOUS; SUBCUTANEOUS at 23:35

## 2024-05-24 RX ADMIN — ONDANSETRON 4 MG: 2 INJECTION INTRAMUSCULAR; INTRAVENOUS at 20:13

## 2024-05-24 RX ADMIN — MORPHINE SULFATE 4 MG: 4 INJECTION, SOLUTION INTRAMUSCULAR; INTRAVENOUS at 19:27

## 2024-05-24 RX ADMIN — IRON SUCROSE 200 MG: 20 INJECTION, SOLUTION INTRAVENOUS at 23:34

## 2024-05-24 RX ADMIN — ONDANSETRON 4 MG: 2 INJECTION INTRAMUSCULAR; INTRAVENOUS at 22:24

## 2024-05-24 RX ADMIN — MORPHINE SULFATE 2 MG: 2 INJECTION, SOLUTION INTRAMUSCULAR; INTRAVENOUS at 22:24

## 2024-05-24 RX ADMIN — Medication 10 ML: at 22:26

## 2024-05-24 RX ADMIN — ORPHENADRINE CITRATE 60 MG: 30 INJECTION, SOLUTION INTRAMUSCULAR; INTRAVENOUS at 18:47

## 2024-05-24 ASSESSMENT — PAIN DESCRIPTION - PAIN TYPE
TYPE: ACUTE PAIN

## 2024-05-24 ASSESSMENT — PAIN DESCRIPTION - ONSET
ONSET: ON-GOING

## 2024-05-24 ASSESSMENT — PAIN DESCRIPTION - LOCATION
LOCATION: HIP

## 2024-05-24 ASSESSMENT — PAIN - FUNCTIONAL ASSESSMENT
PAIN_FUNCTIONAL_ASSESSMENT: 0-10
PAIN_FUNCTIONAL_ASSESSMENT: INTOLERABLE, UNABLE TO DO ANY ACTIVE OR PASSIVE ACTIVITIES
PAIN_FUNCTIONAL_ASSESSMENT: PREVENTS OR INTERFERES WITH MANY ACTIVE NOT PASSIVE ACTIVITIES
PAIN_FUNCTIONAL_ASSESSMENT: 0-10
PAIN_FUNCTIONAL_ASSESSMENT: INTOLERABLE, UNABLE TO DO ANY ACTIVE OR PASSIVE ACTIVITIES

## 2024-05-24 ASSESSMENT — PAIN SCALES - GENERAL
PAINLEVEL_OUTOF10: 10
PAINLEVEL_OUTOF10: 8
PAINLEVEL_OUTOF10: 5
PAINLEVEL_OUTOF10: 8
PAINLEVEL_OUTOF10: 10

## 2024-05-24 ASSESSMENT — PAIN DESCRIPTION - DESCRIPTORS
DESCRIPTORS: THROBBING
DESCRIPTORS: DISCOMFORT
DESCRIPTORS: THROBBING
DESCRIPTORS: THROBBING

## 2024-05-24 ASSESSMENT — PAIN DESCRIPTION - ORIENTATION
ORIENTATION: RIGHT

## 2024-05-24 ASSESSMENT — PAIN DESCRIPTION - FREQUENCY
FREQUENCY: CONTINUOUS

## 2024-05-24 ASSESSMENT — LIFESTYLE VARIABLES
HOW OFTEN DO YOU HAVE A DRINK CONTAINING ALCOHOL: NEVER
HOW MANY STANDARD DRINKS CONTAINING ALCOHOL DO YOU HAVE ON A TYPICAL DAY: PATIENT DOES NOT DRINK

## 2024-05-24 NOTE — ED PROVIDER NOTES
Physician-In-Triage Note    I performed a medical screening examination and evaluated this patient briefly with the purpose of initiating their ED workup in an expeditious manner.  Please see notes from other ED providers regarding comprehensive evaluation including full history, physical exam, interpretation of results, and medical decision making/disposition.    In brief, Mary Bolivar is a 83 y.o. female who presents to the ED complaining of R hip pain after falling and missing the chair she was trying to sit on.  The R hip took the brunt of the fall, unable to ambulate since then or move her R hip in any capacity.  No head or neck pain injury, no pain anywhere else at this time.    Focused physical examination notable for no acute distress, well-appearing, well-nourished, normal speech and mentation without obvious facial droop, no obvious rash.  No obvious cranial nerve deficits on my initial exam.  Not able to ambulate, R hip is notably tender, RLE is mildly shortened and externally rotated, 2+ DP and PT pulses bilat, abd soft NTND and pelvis stable, NC/AT, no Cspine ttp.     Vitals reviewed per nursing documentation.    Triage orders placed, including XR, labs, williamson, meds    I did not personally review any of the results of these tests, which will be reviewed and interpreted later by ED providers at the time of the patient's more comprehensive evaluation.        Don Orozco MD  05/24/24 3705

## 2024-05-24 NOTE — H&P
History and Physical      Name:  Mary Bolivar /Age/Sex: 1940  (83 y.o. female)   MRN & CSN:  [id number] & [id number] Admission Date/Time: 2024  6:35 PM   Location:   PCP: Russ Aponte DO       Hospital Day: 1    Assessment and Plan:   Mary Bolivar is a 83 y.o.  female  who presents with Closed right hip fracture, initial encounter (HCC) after falling out of a chair.    Principal Problem:    Closed fracture of neck of right femur (HCC)  Active Problems:    Fall from chair    Chronic combined systolic and diastolic HF (heart failure), NYHA class 2 (HCC)    Iron deficiency anemia    Paroxysmal atrial flutter (HCC)    Persistent atrial fibrillation (HCC)    History of malignant neoplasm of right breast    Essential hypertension    Stage 3a chronic kidney disease (HCC)    Stenosis of lumbosacral spine    Type 2 diabetes mellitus with kidney complication, without long-term current use of insulin (HCC)    Vitamin D deficiency    Acquired hypothyroidism    Barnhart's esophagus    Mixed anxiety and depressive disorder    Mixed hyperlipidemia    PVCs (premature ventricular contractions)    Vitamin B12 deficiency (non anemic)  Resolved Problems:    * No resolved hospital problems. *       -Present on admission,non weight bearing, multi-modal analgesia, circumspect IV fluids, IV anti-emetics, anticipate surgical intervention in the morning  -avoid disruption of sleep-wake cycle (nighttime overstimulation, etc), dehydration, sensory impairment, provide view of clocks, calendars, windows with outside views, & verbally reorient frequently. Avoid benzodiazapine & anticholinergic use.  -Use of foam or alternating pressure mattresses for pressure ulcer ppx.  -Early mobility with PT&OT post operatively.   -not in CHF exacerbation. TTE from 2021 EF 30-35% with global hypokinesis, mild LVH, LA dilated, no valvular abnormalities.   Time limited IV fluids, continue GDMT and monitor volume status.   -known

## 2024-05-24 NOTE — ED PROVIDER NOTES
EMERGENCY MEDICINE ATTENDING NOTE  Negro Vela Jr., DO, FACEP, FAAEM        CHIEF COMPLAINT  Chief Complaint   Patient presents with    Fall    Hip Pain     Pt arrives via EMS after fall from chair. Pt denies LOC/hitting head. Pt reports R hip pain, leg does not appear shortened or externally rotated. Pt does take blood thinners        HISTORY OF PRESENT ILLNESS  Mary Bolivar is a 83 y.o. female who presents to the ED for evaluation of right hip pain.  Patient was going to sit back in her chair and missed the chair and fell onto the hip.  Is having significant pain and unable to ambulate on it.  Denies any other injury.  No other complaints or concerns.    Nursing/triage notes reviewed.  No other complaints, modifying factors or associated symptoms.     REVIEW OF SYSTEMS:  All systems are reviewed and are negative unless noted in the HPI.    PAST MEDICAL HISTORY  Past Medical History:   Diagnosis Date    Breast cancer (HCC) 2022    Right    Cancer (HCC) 2022    right breast    COVID-2021    Diabetes mellitus (HCC)     Hx of blood clots     in legs- on Coumadin    Hyperlipidemia     Hypertension     Nervous breakdown       - Dec 21    Pneumothorax     during surgery - in        SURGICAL HISTORY  Past Surgical History:   Procedure Laterality Date    BREAST BIOPSY Right 2022    RIGHT BREAST LUMPECTOMY/ RIGHT BREAST SENTINEL NODE BIOPSY performed by Chata Torres MD at [facility] OR    BREAST SURGERY Right 2022    RE-EXCISION RIGHT BREAST LUMPECTOMY performed by Chata Torres MD at [facility] OR    CHOLECYSTECTOMY      EYE SURGERY      cataract with IOL    HYSTERECTOMY (CERVIX STATUS UNKNOWN)      US BREAST BIOPSY W LOC DEVICE 1ST LESION RIGHT Right 2022    US BREAST NEEDLE BIOPSY RIGHT 6/3/2022 Estrella Moreno MD [facility] ULTRASOUND       FAMILY HISTORY  Family History   Problem Relation Age of Onset    Breast Cancer Neg Hx     Ovarian Cancer Neg Hx

## 2024-05-25 ENCOUNTER — ANESTHESIA (OUTPATIENT)
Age: 84
End: 2024-05-25
Payer: MEDICARE

## 2024-05-25 ENCOUNTER — ANESTHESIA EVENT (OUTPATIENT)
Age: 84
End: 2024-05-25
Payer: MEDICARE

## 2024-05-25 ENCOUNTER — APPOINTMENT (OUTPATIENT)
Age: 84
DRG: 522 | End: 2024-05-25
Payer: MEDICARE

## 2024-05-25 LAB
ALBUMIN SERPL-MCNC: 3.3 G/DL (ref 3.4–5)
ALBUMIN/GLOB SERPL: 1.1 {RATIO}
ALP SERPL-CCNC: 119 U/L (ref 40–129)
ALT SERPL-CCNC: 21 U/L (ref 10–40)
ANION GAP SERPL CALCULATED.3IONS-SCNC: 10 MMOL/L (ref 3–16)
AST SERPL-CCNC: 44 U/L (ref 15–37)
BASOPHILS # BLD: 0.01 K/UL (ref 0–0.2)
BASOPHILS NFR BLD: 0 %
BILIRUB SERPL-MCNC: 0.3 MG/DL (ref 0–1)
BUN SERPL-MCNC: 18 MG/DL (ref 7–20)
CALCIUM SERPL-MCNC: 8.5 MG/DL (ref 8.3–10.6)
CHLORIDE SERPL-SCNC: 102 MMOL/L (ref 99–110)
CO2 SERPL-SCNC: 25 MMOL/L (ref 21–32)
CREAT SERPL-MCNC: 0.9 MG/DL (ref 0.6–1.2)
EOSINOPHIL # BLD: 0 K/UL (ref 0–0.6)
EOSINOPHILS RELATIVE PERCENT: 0 %
ERYTHROCYTE [DISTWIDTH] IN BLOOD BY AUTOMATED COUNT: 19.2 % (ref 12.4–15.4)
FERRITIN SERPL-MCNC: 90 NG/ML (ref 15–150)
GFR, ESTIMATED: 62 ML/MIN/1.73M2
GLUCOSE BLD-MCNC: 101 MG/DL (ref 70–99)
GLUCOSE BLD-MCNC: 136 MG/DL (ref 70–99)
GLUCOSE BLD-MCNC: 138 MG/DL (ref 70–99)
GLUCOSE BLD-MCNC: 150 MG/DL (ref 70–99)
GLUCOSE BLD-MCNC: 227 MG/DL (ref 70–99)
GLUCOSE BLD-MCNC: 254 MG/DL (ref 70–99)
GLUCOSE SERPL-MCNC: 138 MG/DL (ref 70–99)
HCT VFR BLD AUTO: 30.8 % (ref 36–48)
HGB BLD-MCNC: 9.5 G/DL (ref 12–16)
IMM GRANULOCYTES # BLD AUTO: 0.07 K/UL (ref 0–0.5)
IMM GRANULOCYTES NFR BLD: 1 %
INR PPP: 1.5 (ref 0.9–1.2)
IRON SERPL-MCNC: 18 UG/DL (ref 37–145)
LYMPHOCYTES NFR BLD: 1.24 K/UL (ref 1–5.1)
LYMPHOCYTES RELATIVE PERCENT: 22 %
MCH RBC QN AUTO: 23.6 PG (ref 26–34)
MCHC RBC AUTO-ENTMCNC: 30.8 G/DL (ref 31–36)
MCV RBC AUTO: 76.6 FL (ref 80–100)
MONOCYTES NFR BLD: 0.77 K/UL (ref 0–1.3)
MONOCYTES NFR BLD: 14 %
NEUTROPHILS NFR BLD: 62 %
NEUTS SEG NFR BLD: 3.47 K/UL (ref 1.7–7.7)
PHOSPHATE SERPL-MCNC: 3.7 MG/DL (ref 2.5–4.9)
PLATELET # BLD AUTO: 107 K/UL (ref 135–450)
PLATELET CONFIRMATION: NORMAL
PMV BLD AUTO: 10.1 FL
POTASSIUM SERPL-SCNC: 3.7 MMOL/L (ref 3.5–5.1)
PROT SERPL-MCNC: 6.4 G/DL (ref 6.4–8.2)
PROTHROMBIN TIME: 18.6 SEC (ref 11.9–14.9)
RBC # BLD AUTO: 4.02 M/UL (ref 4–5.2)
SODIUM SERPL-SCNC: 137 MMOL/L (ref 136–145)
TIBC SERPL-MCNC: 255 UG/DL (ref 260–445)
WBC OTHER # BLD: 5.6 K/UL (ref 4–11)

## 2024-05-25 PROCEDURE — 80053 COMPREHEN METABOLIC PANEL: CPT

## 2024-05-25 PROCEDURE — 6360000002 HC RX W HCPCS: Performed by: ANESTHESIOLOGY

## 2024-05-25 PROCEDURE — 6360000002 HC RX W HCPCS: Performed by: HOSPITALIST

## 2024-05-25 PROCEDURE — 99223 1ST HOSP IP/OBS HIGH 75: CPT | Performed by: ORTHOPAEDIC SURGERY

## 2024-05-25 PROCEDURE — 73502 X-RAY EXAM HIP UNI 2-3 VIEWS: CPT

## 2024-05-25 PROCEDURE — 94761 N-INVAS EAR/PLS OXIMETRY MLT: CPT

## 2024-05-25 PROCEDURE — 0SRR01A REPLACEMENT OF RIGHT HIP JOINT, FEMORAL SURFACE WITH METAL SYNTHETIC SUBSTITUTE, UNCEMENTED, OPEN APPROACH: ICD-10-PCS | Performed by: ORTHOPAEDIC SURGERY

## 2024-05-25 PROCEDURE — 2580000003 HC RX 258: Performed by: ORTHOPAEDIC SURGERY

## 2024-05-25 PROCEDURE — 85610 PROTHROMBIN TIME: CPT

## 2024-05-25 PROCEDURE — 3700000001 HC ADD 15 MINUTES (ANESTHESIA): Performed by: ORTHOPAEDIC SURGERY

## 2024-05-25 PROCEDURE — 6360000002 HC RX W HCPCS: Performed by: ORTHOPAEDIC SURGERY

## 2024-05-25 PROCEDURE — 36415 COLL VENOUS BLD VENIPUNCTURE: CPT

## 2024-05-25 PROCEDURE — 7100000001 HC PACU RECOVERY - ADDTL 15 MIN: Performed by: ORTHOPAEDIC SURGERY

## 2024-05-25 PROCEDURE — 82962 GLUCOSE BLOOD TEST: CPT

## 2024-05-25 PROCEDURE — 88305 TISSUE EXAM BY PATHOLOGIST: CPT

## 2024-05-25 PROCEDURE — 2709999900 HC NON-CHARGEABLE SUPPLY: Performed by: ORTHOPAEDIC SURGERY

## 2024-05-25 PROCEDURE — A4217 STERILE WATER/SALINE, 500 ML: HCPCS | Performed by: ORTHOPAEDIC SURGERY

## 2024-05-25 PROCEDURE — 3600000015 HC SURGERY LEVEL 5 ADDTL 15MIN: Performed by: ORTHOPAEDIC SURGERY

## 2024-05-25 PROCEDURE — 6370000000 HC RX 637 (ALT 250 FOR IP): Performed by: NURSE PRACTITIONER

## 2024-05-25 PROCEDURE — C1776 JOINT DEVICE (IMPLANTABLE): HCPCS | Performed by: ORTHOPAEDIC SURGERY

## 2024-05-25 PROCEDURE — 6370000000 HC RX 637 (ALT 250 FOR IP): Performed by: HOSPITALIST

## 2024-05-25 PROCEDURE — 2700000000 HC OXYGEN THERAPY PER DAY

## 2024-05-25 PROCEDURE — 3700000000 HC ANESTHESIA ATTENDED CARE: Performed by: ORTHOPAEDIC SURGERY

## 2024-05-25 PROCEDURE — 86923 COMPATIBILITY TEST ELECTRIC: CPT

## 2024-05-25 PROCEDURE — 2500000003 HC RX 250 WO HCPCS: Performed by: ORTHOPAEDIC SURGERY

## 2024-05-25 PROCEDURE — 6360000002 HC RX W HCPCS

## 2024-05-25 PROCEDURE — 6360000002 HC RX W HCPCS: Performed by: NURSE PRACTITIONER

## 2024-05-25 PROCEDURE — 2500000003 HC RX 250 WO HCPCS: Performed by: ANESTHESIOLOGY

## 2024-05-25 PROCEDURE — 6370000000 HC RX 637 (ALT 250 FOR IP): Performed by: ORTHOPAEDIC SURGERY

## 2024-05-25 PROCEDURE — 84100 ASSAY OF PHOSPHORUS: CPT

## 2024-05-25 PROCEDURE — 86901 BLOOD TYPING SEROLOGIC RH(D): CPT

## 2024-05-25 PROCEDURE — 27236 TREAT THIGH FRACTURE: CPT | Performed by: ORTHOPAEDIC SURGERY

## 2024-05-25 PROCEDURE — 82746 ASSAY OF FOLIC ACID SERUM: CPT

## 2024-05-25 PROCEDURE — 2060000000 HC ICU INTERMEDIATE R&B

## 2024-05-25 PROCEDURE — 94150 VITAL CAPACITY TEST: CPT

## 2024-05-25 PROCEDURE — 3600000005 HC SURGERY LEVEL 5 BASE: Performed by: ORTHOPAEDIC SURGERY

## 2024-05-25 PROCEDURE — 2580000003 HC RX 258: Performed by: ANESTHESIOLOGY

## 2024-05-25 PROCEDURE — 88311 DECALCIFY TISSUE: CPT

## 2024-05-25 PROCEDURE — 85025 COMPLETE CBC W/AUTO DIFF WBC: CPT

## 2024-05-25 PROCEDURE — 86900 BLOOD TYPING SEROLOGIC ABO: CPT

## 2024-05-25 PROCEDURE — 7100000000 HC PACU RECOVERY - FIRST 15 MIN: Performed by: ORTHOPAEDIC SURGERY

## 2024-05-25 PROCEDURE — 82607 VITAMIN B-12: CPT

## 2024-05-25 PROCEDURE — 86850 RBC ANTIBODY SCREEN: CPT

## 2024-05-25 DEVICE — HEAD FEM OD46MM ID26MM HIP CO CHROM POLYETH BPLR CEMENTLESS: Type: IMPLANTABLE DEVICE | Site: HIP | Status: FUNCTIONAL

## 2024-05-25 DEVICE — STEM FEM SZ 5 L108MM NK L35MM 44MM OFFSET 127DEG HIP TI: Type: IMPLANTABLE DEVICE | Site: HIP | Status: FUNCTIONAL

## 2024-05-25 DEVICE — HEAD FEM DIA26MM +0MM OFFSET HIP CO CHROM V40 TAPR LO FRIC: Type: IMPLANTABLE DEVICE | Site: HIP | Status: FUNCTIONAL

## 2024-05-25 RX ORDER — FENTANYL CITRATE 50 UG/ML
INJECTION, SOLUTION INTRAMUSCULAR; INTRAVENOUS PRN
Status: DISCONTINUED | OUTPATIENT
Start: 2024-05-25 | End: 2024-05-25 | Stop reason: SDUPTHER

## 2024-05-25 RX ORDER — SODIUM CHLORIDE 0.9 % (FLUSH) 0.9 %
5-40 SYRINGE (ML) INJECTION EVERY 12 HOURS SCHEDULED
Status: DISCONTINUED | OUTPATIENT
Start: 2024-05-25 | End: 2024-05-25 | Stop reason: HOSPADM

## 2024-05-25 RX ORDER — SODIUM CHLORIDE 9 MG/ML
INJECTION, SOLUTION INTRAVENOUS CONTINUOUS PRN
Status: DISCONTINUED | OUTPATIENT
Start: 2024-05-25 | End: 2024-05-25 | Stop reason: SDUPTHER

## 2024-05-25 RX ORDER — SODIUM CHLORIDE 450 MG/100ML
INJECTION, SOLUTION INTRAVENOUS CONTINUOUS
Status: DISCONTINUED | OUTPATIENT
Start: 2024-05-25 | End: 2024-05-26

## 2024-05-25 RX ORDER — MAGNESIUM HYDROXIDE 1200 MG/15ML
LIQUID ORAL CONTINUOUS PRN
Status: COMPLETED | OUTPATIENT
Start: 2024-05-25 | End: 2024-05-25

## 2024-05-25 RX ORDER — ONDANSETRON 2 MG/ML
4 INJECTION INTRAMUSCULAR; INTRAVENOUS
Status: COMPLETED | OUTPATIENT
Start: 2024-05-25 | End: 2024-05-25

## 2024-05-25 RX ORDER — SODIUM CHLORIDE 9 MG/ML
INJECTION, SOLUTION INTRAVENOUS PRN
Status: DISCONTINUED | OUTPATIENT
Start: 2024-05-25 | End: 2024-05-25 | Stop reason: HOSPADM

## 2024-05-25 RX ORDER — PHENYLEPHRINE HCL IN 0.9% NACL 1 MG/10 ML
SYRINGE (ML) INTRAVENOUS PRN
Status: DISCONTINUED | OUTPATIENT
Start: 2024-05-25 | End: 2024-05-25 | Stop reason: SDUPTHER

## 2024-05-25 RX ORDER — APREPITANT 40 MG/1
CAPSULE ORAL
Status: COMPLETED
Start: 2024-05-25 | End: 2024-05-25

## 2024-05-25 RX ORDER — LOSARTAN POTASSIUM 50 MG/1
50 TABLET ORAL DAILY
Status: DISCONTINUED | OUTPATIENT
Start: 2024-05-25 | End: 2024-05-29 | Stop reason: HOSPADM

## 2024-05-25 RX ORDER — FENTANYL CITRATE 50 UG/ML
50 INJECTION, SOLUTION INTRAMUSCULAR; INTRAVENOUS EVERY 5 MIN PRN
Status: DISCONTINUED | OUTPATIENT
Start: 2024-05-25 | End: 2024-05-25 | Stop reason: HOSPADM

## 2024-05-25 RX ORDER — HYDRALAZINE HYDROCHLORIDE 20 MG/ML
5 INJECTION INTRAMUSCULAR; INTRAVENOUS EVERY 4 HOURS PRN
Status: DISCONTINUED | OUTPATIENT
Start: 2024-05-25 | End: 2024-05-29 | Stop reason: HOSPADM

## 2024-05-25 RX ORDER — NALOXONE HYDROCHLORIDE 0.4 MG/ML
INJECTION, SOLUTION INTRAMUSCULAR; INTRAVENOUS; SUBCUTANEOUS PRN
Status: DISCONTINUED | OUTPATIENT
Start: 2024-05-25 | End: 2024-05-25 | Stop reason: HOSPADM

## 2024-05-25 RX ORDER — SERTRALINE HYDROCHLORIDE 25 MG/1
25 TABLET, FILM COATED ORAL DAILY
Status: DISCONTINUED | OUTPATIENT
Start: 2024-05-25 | End: 2024-05-29 | Stop reason: HOSPADM

## 2024-05-25 RX ORDER — GENTAMICIN SULFATE 80 MG/100ML
INJECTION, SOLUTION INTRAVENOUS CONTINUOUS PRN
Status: COMPLETED | OUTPATIENT
Start: 2024-05-25 | End: 2024-05-25

## 2024-05-25 RX ORDER — PROPOFOL 10 MG/ML
INJECTION, EMULSION INTRAVENOUS PRN
Status: DISCONTINUED | OUTPATIENT
Start: 2024-05-25 | End: 2024-05-25 | Stop reason: SDUPTHER

## 2024-05-25 RX ORDER — SODIUM CHLORIDE 0.9 % (FLUSH) 0.9 %
5-40 SYRINGE (ML) INJECTION PRN
Status: DISCONTINUED | OUTPATIENT
Start: 2024-05-25 | End: 2024-05-25 | Stop reason: HOSPADM

## 2024-05-25 RX ORDER — SERTRALINE HYDROCHLORIDE 25 MG/1
25 TABLET, FILM COATED ORAL DAILY
COMMUNITY
Start: 2023-07-25

## 2024-05-25 RX ORDER — PANTOPRAZOLE SODIUM 40 MG/1
40 TABLET, DELAYED RELEASE ORAL
Status: DISCONTINUED | OUTPATIENT
Start: 2024-05-25 | End: 2024-05-29 | Stop reason: HOSPADM

## 2024-05-25 RX ORDER — ONDANSETRON 2 MG/ML
INJECTION INTRAMUSCULAR; INTRAVENOUS PRN
Status: DISCONTINUED | OUTPATIENT
Start: 2024-05-25 | End: 2024-05-25 | Stop reason: SDUPTHER

## 2024-05-25 RX ORDER — ENOXAPARIN SODIUM 100 MG/ML
40 INJECTION SUBCUTANEOUS DAILY
Status: DISCONTINUED | OUTPATIENT
Start: 2024-05-25 | End: 2024-05-29 | Stop reason: HOSPADM

## 2024-05-25 RX ORDER — FENTANYL CITRATE 50 UG/ML
25 INJECTION, SOLUTION INTRAMUSCULAR; INTRAVENOUS EVERY 5 MIN PRN
Status: DISCONTINUED | OUTPATIENT
Start: 2024-05-25 | End: 2024-05-25 | Stop reason: HOSPADM

## 2024-05-25 RX ORDER — SODIUM CHLORIDE 0.9 % (FLUSH) 0.9 %
5-40 SYRINGE (ML) INJECTION PRN
Status: DISCONTINUED | OUTPATIENT
Start: 2024-05-25 | End: 2024-05-29 | Stop reason: HOSPADM

## 2024-05-25 RX ORDER — SODIUM CHLORIDE 0.9 % (FLUSH) 0.9 %
5-40 SYRINGE (ML) INJECTION EVERY 12 HOURS SCHEDULED
Status: DISCONTINUED | OUTPATIENT
Start: 2024-05-25 | End: 2024-05-29 | Stop reason: HOSPADM

## 2024-05-25 RX ORDER — APREPITANT 40 MG/1
40 CAPSULE ORAL ONCE
Status: COMPLETED | OUTPATIENT
Start: 2024-05-25 | End: 2024-05-25

## 2024-05-25 RX ORDER — SODIUM CHLORIDE 9 MG/ML
INJECTION, SOLUTION INTRAVENOUS PRN
Status: DISCONTINUED | OUTPATIENT
Start: 2024-05-25 | End: 2024-05-29 | Stop reason: HOSPADM

## 2024-05-25 RX ORDER — ROCURONIUM BROMIDE 10 MG/ML
INJECTION, SOLUTION INTRAVENOUS PRN
Status: DISCONTINUED | OUTPATIENT
Start: 2024-05-25 | End: 2024-05-25 | Stop reason: SDUPTHER

## 2024-05-25 RX ADMIN — PROPOFOL 140 MG: 10 INJECTION, EMULSION INTRAVENOUS at 08:20

## 2024-05-25 RX ADMIN — SODIUM CHLORIDE: 4.5 INJECTION, SOLUTION INTRAVENOUS at 11:29

## 2024-05-25 RX ADMIN — ROCURONIUM BROMIDE 40 MG: 10 INJECTION, SOLUTION INTRAVENOUS at 08:20

## 2024-05-25 RX ADMIN — Medication 200 MCG: at 08:32

## 2024-05-25 RX ADMIN — MORPHINE SULFATE 2 MG: 2 INJECTION, SOLUTION INTRAMUSCULAR; INTRAVENOUS at 00:21

## 2024-05-25 RX ADMIN — FENTANYL CITRATE 50 MCG: 50 INJECTION INTRAMUSCULAR; INTRAVENOUS at 10:30

## 2024-05-25 RX ADMIN — ENOXAPARIN SODIUM 40 MG: 100 INJECTION SUBCUTANEOUS at 13:33

## 2024-05-25 RX ADMIN — ONDANSETRON 4 MG: 2 INJECTION INTRAMUSCULAR; INTRAVENOUS at 09:20

## 2024-05-25 RX ADMIN — Medication 3 MG: at 03:24

## 2024-05-25 RX ADMIN — HYDROMORPHONE HYDROCHLORIDE 0.25 MG: 1 INJECTION, SOLUTION INTRAMUSCULAR; INTRAVENOUS; SUBCUTANEOUS at 10:47

## 2024-05-25 RX ADMIN — WATER 2000 MG: 1 INJECTION INTRAMUSCULAR; INTRAVENOUS; SUBCUTANEOUS at 17:14

## 2024-05-25 RX ADMIN — SUGAMMADEX 200 MG: 100 INJECTION, SOLUTION INTRAVENOUS at 09:31

## 2024-05-25 RX ADMIN — APREPITANT 40 MG: 40 CAPSULE ORAL at 08:10

## 2024-05-25 RX ADMIN — Medication 100 MCG: at 09:05

## 2024-05-25 RX ADMIN — ONDANSETRON 4 MG: 2 INJECTION INTRAMUSCULAR; INTRAVENOUS at 10:22

## 2024-05-25 RX ADMIN — FENTANYL CITRATE 50 MCG: 50 INJECTION INTRAMUSCULAR; INTRAVENOUS at 08:34

## 2024-05-25 RX ADMIN — FENTANYL CITRATE 50 MCG: 50 INJECTION INTRAMUSCULAR; INTRAVENOUS at 10:05

## 2024-05-25 RX ADMIN — ACETAMINOPHEN 650 MG: 325 TABLET ORAL at 23:00

## 2024-05-25 RX ADMIN — SODIUM CHLORIDE: 9 INJECTION, SOLUTION INTRAVENOUS at 08:14

## 2024-05-25 RX ADMIN — MORPHINE SULFATE 2 MG: 2 INJECTION, SOLUTION INTRAMUSCULAR; INTRAVENOUS at 04:08

## 2024-05-25 RX ADMIN — MORPHINE SULFATE 2 MG: 2 INJECTION, SOLUTION INTRAMUSCULAR; INTRAVENOUS at 06:20

## 2024-05-25 RX ADMIN — INSULIN LISPRO 1 UNITS: 100 INJECTION, SOLUTION INTRAVENOUS; SUBCUTANEOUS at 17:21

## 2024-05-25 RX ADMIN — HYDRALAZINE HYDROCHLORIDE 5 MG: 20 INJECTION INTRAMUSCULAR; INTRAVENOUS at 00:20

## 2024-05-25 RX ADMIN — FENTANYL CITRATE 50 MCG: 50 INJECTION INTRAMUSCULAR; INTRAVENOUS at 08:20

## 2024-05-25 RX ADMIN — CEFAZOLIN 2000 MG: 2 INJECTION, POWDER, FOR SOLUTION INTRAMUSCULAR; INTRAVENOUS at 08:24

## 2024-05-25 RX ADMIN — WARFARIN SODIUM 3 MG: 2 TABLET ORAL at 18:32

## 2024-05-25 ASSESSMENT — PAIN DESCRIPTION - ORIENTATION
ORIENTATION: RIGHT

## 2024-05-25 ASSESSMENT — PAIN SCALES - GENERAL
PAINLEVEL_OUTOF10: 7
PAINLEVEL_OUTOF10: 8
PAINLEVEL_OUTOF10: 0
PAINLEVEL_OUTOF10: 10
PAINLEVEL_OUTOF10: 8
PAINLEVEL_OUTOF10: 9
PAINLEVEL_OUTOF10: 8
PAINLEVEL_OUTOF10: 8
PAINLEVEL_OUTOF10: 0
PAINLEVEL_OUTOF10: 0
PAINLEVEL_OUTOF10: 9
PAINLEVEL_OUTOF10: 8
PAINLEVEL_OUTOF10: 0
PAINLEVEL_OUTOF10: 10
PAINLEVEL_OUTOF10: 0
PAINLEVEL_OUTOF10: 7
PAINLEVEL_OUTOF10: 6

## 2024-05-25 ASSESSMENT — PAIN DESCRIPTION - DESCRIPTORS
DESCRIPTORS: ACHING
DESCRIPTORS: THROBBING
DESCRIPTORS: ACHING
DESCRIPTORS: ACHING;DISCOMFORT

## 2024-05-25 ASSESSMENT — PAIN DESCRIPTION - LOCATION
LOCATION: HIP

## 2024-05-25 ASSESSMENT — PAIN - FUNCTIONAL ASSESSMENT
PAIN_FUNCTIONAL_ASSESSMENT: INTOLERABLE, UNABLE TO DO ANY ACTIVE OR PASSIVE ACTIVITIES
PAIN_FUNCTIONAL_ASSESSMENT: 0-10

## 2024-05-25 ASSESSMENT — LIFESTYLE VARIABLES: SMOKING_STATUS: 0

## 2024-05-25 ASSESSMENT — PAIN DESCRIPTION - PAIN TYPE
TYPE: ACUTE PAIN
TYPE: SURGICAL PAIN;ACUTE PAIN
TYPE: ACUTE PAIN

## 2024-05-25 ASSESSMENT — PAIN DESCRIPTION - FREQUENCY
FREQUENCY: CONTINUOUS

## 2024-05-25 ASSESSMENT — PAIN DESCRIPTION - ONSET
ONSET: ON-GOING

## 2024-05-25 ASSESSMENT — PAIN SCALES - WONG BAKER: WONGBAKER_NUMERICALRESPONSE: NO HURT

## 2024-05-25 NOTE — ANESTHESIA POSTPROCEDURE EVALUATION
Department of Anesthesiology  Postprocedure Note    Patient: Mary Bolivar  MRN: [id number]  YOB: 1940  Date of evaluation: 5/25/2024    Procedure Summary       Date: 05/25/24 Room / Location: [address]    Anesthesia Start: 0814 Anesthesia Stop: 0955    Procedure: HIP HEMIARTHROPLASTY (Right: Hip) Diagnosis:       Closed fracture of right hip requiring operative repair, initial encounter ([facility])      (Closed fracture of right hip requiring operative repair, initial encounter ([facility]) [S72.001A])    Surgeons: Jona Alvarez MD Responsible Provider: Skip Cheek MD    Anesthesia Type: general ASA Status: 3            Anesthesia Type: No value filed.    Mitra Phase I: Mitra Score: 10    Mitra Phase II:      Anesthesia Post Evaluation    Patient location during evaluation: PACU  Patient participation: complete - patient participated  Level of consciousness: awake and alert  Pain score: 3  Airway patency: patent  Nausea & Vomiting: no nausea and no vomiting  Cardiovascular status: blood pressure returned to baseline  Respiratory status: acceptable  Hydration status: euvolemic  Pain management: adequate    No notable events documented.

## 2024-05-25 NOTE — CONSULTS
[facility]    Pharmacy Progress Note    Warfarin - Management by Pharmacy    Consult Date(s): 05/25/24  Consulting Provider(s): Kirti SANDOVAL-CNP    Assessment / Plan  Atrial Fibrillation - Warfarin  Goal INR: 2 - 3  Concurrent Anticoagulants / Antiplatelets: Enoxaparin DVT prophylaxis dosing per Ortho until INR is therapeutic  Interactions:   Amiodarone (typically increases INR, but patient is on this at home so do not expect any changes)  Current Regimen / Plan:   INR this AM is 1.5  Patient's home regimen is warfarin 3 mg daily, last dose was on 05/23/24  Patient also received a one time dose of vitamin K SQ 05/24/24. Given this was SQ, the kinetics of the vitamin K are not as reliable. This could affect the INR over the next few days  Will order warfarin 3 mg x1 for today  Will monitor pt's clinical status and INR daily, and make dose adjustments as needed.    Thank you for consulting pharmacy,    Jayesh Calderon PharmD  [facility]   [id number]  5/25/2024   1:03 PM        Subjective/Objective:   Mary Bolivar is a 83 y.o. female with a PMHx significant for recurrent dizziness, Chronic HFrEF, PAF on Coumadin, provoked PE with DVT in 2015, Well Controlled NIDDM, Right Invasive Ductal Carcinoma s/p lumpectomy x 2 & Stage IIIa CKD  who is admitted with closed fracture of right femur.     Pharmacy is consulted to dose warfarin.    Ht Readings from Last 1 Encounters:   05/24/24 1.6 m (5' 3\")     Wt Readings from Last 1 Encounters:   05/24/24 66.4 kg (146 lb 7.7 oz)     Prior / Home Warfarin Regimen:  Patient appears to utilize home care services and self-tests her INR at home. Looks like last visit with home health care APRN was 02/20/24 and INR was not documented  Most recent home dose appears to be warfarin 3 mg daily    Date INR Warfarin Notes   5/24 1.7 -- 5 mg of Vitamin K given   5/25 1.5 3 mg ordered                    Recent Labs     05/24/24  [id number]

## 2024-05-25 NOTE — FLOWSHEET NOTE
05/25/24 1114   Vital Signs   Temp 97.7 °F (36.5 °C)   Temp Source Oral   Pulse 76   Heart Rate Source Monitor   Respirations 15   /67   MAP (Calculated) 89   BP Location Left upper arm   Patient Position Semi fowlers   Pain Assessment   Pain Assessment 0-10   Pain Level 8   Pain Location Hip   Pain Orientation Right   Pain Descriptors Aching;Discomfort   Functional Pain Assessment Intolerable, unable to do any active or passive activities   Pain Type Surgical pain;Acute pain   Pain Frequency Continuous   Pain Onset On-going   Non-Pharmaceutical Pain Intervention(s) Ice   Opioid-Induced Sedation   POSS Score 1   RASS   Boyer Agitation Sedation Scale (RASS) 0   Oxygen Therapy   SpO2 100 %   O2 Device Nasal cannula   O2 Flow Rate (L/min) 2 L/min     Pt returned from OR. Dressing to R hip is clean, dry and intact. Pt o/a x4. Family at bedside;updated on care plan. Pt stable.    Mercedes Meraz RN

## 2024-05-25 NOTE — ED NOTES
Hospitalist at bedside.   
Pt off floor at radiology by stretcher.     
Pt reports she is feeling nauseous and is vomiting, pt requesting medication for nausea at this time.   
  Diagnosis Date    Breast cancer (HCC) 2022    Right    Cancer (HCC) 2022    right breast    COVID-19 2021    Diabetes mellitus (HCC)     Hx of blood clots     in legs- on Coumadin    Hyperlipidemia     Hypertension     Nervous breakdown       - Dec 21    Pneumothorax     during surgery - in        Assessment  Vitals: MEWS Score: 3  Level of Consciousness: Alert (0)   Vitals:    24 1900 24   BP: (!) 179/106 (!) 172/133 (!) 182/78 (!) 167/78   Pulse: 77 81 75 72   Resp: 14 19 14 14   Temp:       TempSrc:       SpO2: 95% 97% 98% 96%   Weight:       Height:         Deterioration Index (DI): Deterioration Index: 26.88  Deterioration Index (DI) Interventions Performed:    O2 Flow Rate:    O2 Device:    Cardiac Rhythm:    Critical Lab Results: [unfilled]  Cultures: Cultures:None  NIH Score: NIH     Active LDA's:   Peripheral IV 24 Right Antecubital (Active)     Active Central Lines:                          Active Wounds:    Active Oreilly's:    Active Feeding Tubes:      Administered Medications:   Medications   morphine injection 4 mg (4 mg IntraVENous Given 24)   orphenadrine (NORFLEX) injection 60 mg (60 mg IntraVENous Given 24)   ondansetron (ZOFRAN) injection 4 mg (4 mg IntraVENous Given 24)     Last documented pain medication administration:   Pertinent or High Risk Medications/Drips: no   If Yes, please provide details: n/a  Blood Product Administration: no  If Yes, please provide details: n/a  Process Protocols/Bundles: N/A    Recommendation  Incomplete STAT orders: n/a  Overdue Medications: n/a  Patient Belongings:    Additional Comments: n/a  If any further questions, please call Sending RN at [phone]      Admitting Unit Notification  Name of person notified and time: via Epic at       Electronically signed by: Electronically signed by Zoraida Motta RN on 2024 at 9:08 PM

## 2024-05-25 NOTE — BRIEF OP NOTE
Brief Postoperative Note      Patient: Mary Bolivar  YOB: 1940  MRN: [id number]    Date of Procedure: 5/25/2024    Pre-Op Diagnosis Codes:     * Closed fracture of right hip requiring operative repair, initial encounter ([facility]) [S72.001A]    Post-Op Diagnosis: Same       Procedure(s):  HIP HEMIARTHROPLASTY    Surgeon(s):  Jona Alvarez MD    Assistant:  Surgical Assistant: Nga Cadena    Anesthesia: General    Estimated Blood Loss (mL): less than 100     Complications: None    Specimens:   ID Type Source Tests Collected by Time Destination   A : RIGHT FEMORAL HEAD Bone Bone SURGICAL PATHOLOGY Jona Alvarez MD 5/25/2024 0922        Implants:  Implant Name Type Inv. Item Serial No.  Lot No. LRB No. Used Action   STEM FEM SZ 5 L108MM NK L35MM 44MM OFFSET 127DEG HIP TI - [id number]  STEM FEM SZ 5 L108MM NK L35MM 44MM OFFSET 127DEG HIP TI  [employer] [facility] [id number] Right 1 Implanted   HEAD FEM [id number] +0MM OFFSET HIP CO CHROM V40 TAPR LO FRIC - [id number]  HEAD FEM [id number] +0MM OFFSET HIP CO CHROM V40 TAPR LO FRIC  [employer] [facility] [id number] Right 1 Implanted   HEAD FEM OD46MM ID26MM HIP CO CHROM POLYETH BPLR CEMENTLESS - [id number]  HEAD FEM OD46MM ID26MM HIP CO CHROM POLYETH BPLR CEMENTLESS  [employer] [facility] [id number] Right 1 Implanted         Drains:   Urinary Catheter 05/24/24 Oreilly (Active)   $ Urethral catheter insertion $ Not inserted for procedure 05/24/24 1846   Catheter Indications Prolonged immobilization (e.g. unstable thoracic or lumbar spine, multiple traumatic injuries such as pelvic fractures) 05/25/24 0730   Site Assessment No urethral drainage 05/25/24 0730   Urine Color Yellow 05/25/24 0730   Urine Appearance Clear 05/24/24 2200   Securement Method Securing device (Describe) 05/24/24 2200   Catheter Care  Soap and water 05/24/24 2200   Catheter Best Practices  Drainage tube clipped to bed;Catheter secured to thigh;Tamper seal

## 2024-05-25 NOTE — ANESTHESIA PRE PROCEDURE
Department of Anesthesiology  Preprocedure Note       Name:  Mary Bolivar   Age:  83 y.o.  :  1940                                          MRN:  [id number]         Date:  2024      Surgeon: Surgeon(s):  Jona Alvarez MD    Procedure: Procedure(s):  HIP HEMIARTHROPLASTY    Medications prior to admission:   Prior to Admission medications    Medication Sig Start Date End Date Taking? Authorizing Provider   sertraline (ZOLOFT) 25 MG tablet Take 1 tablet by mouth daily 23  Yes ProviderDelmis MD   carvedilol (COREG) 3.125 MG tablet Take 1 tablet by mouth in the morning and at bedtime 22   Delmis León MD   amiodarone (CORDARONE) 200 MG tablet Take 1 tablet by mouth daily 1/2 tab daily    Delmis León MD   atorvastatin (LIPITOR) 10 MG tablet Take 1 tablet by mouth daily 22   Delmis León MD   furosemide (LASIX) 20 MG tablet Take 1 tablet by mouth daily 22   Delmis León MD   losartan (COZAAR) 50 MG tablet Take 1 tablet by mouth daily    Delmis León MD   metFORMIN (GLUCOPHAGE) 500 MG tablet Take 1 tablet by mouth daily 22   Delmis León MD   linagliptin (TRADJENTA) 5 MG tablet Take 1 tablet by mouth daily    Delmis León MD   warfarin (COUMADIN) 2 MG tablet Take 1 tablet by mouth daily 22   Delmis León MD       Current medications:    Current Facility-Administered Medications   Medication Dose Route Frequency Provider Last Rate Last Admin   • sertraline (ZOLOFT) tablet 25 mg  25 mg Oral Daily Skip Weir DO       • losartan (COZAAR) tablet 50 mg  50 mg Oral Daily Skip Weir DO       • hydrALAZINE (APRESOLINE) injection 5 mg  5 mg IntraVENous Q4H PRN Skip Weir DO   5 mg at 24 0020   • amiodarone (CORDARONE) tablet 200 mg  200 mg Oral Daily Skip Weir DO       • insulin lispro (HUMALOG,ADMELOG) injection vial 0-4 Units  0-4 Units SubCUTAneous TID  Skip Weir DO       •

## 2024-05-25 NOTE — OP NOTE
[facility]                            OPERATIVE REPORT      PATIENT NAME: DERECK MARCIAL              : 1940  MED REC NO: [id number]                      ROOM: 3204  ACCOUNT NO: [id number]                       ADMIT DATE: 2024  PROVIDER: Jona Alvarez MD      DATE OF PROCEDURE:  2024    SURGEON:  Jona Alvarez MD    ASSISTANT:  Nga surgical assistant.    PREOPERATIVE DIAGNOSIS:  Right hip displaced femoral neck fracture.    POSTOPERATIVE DIAGNOSIS:  Right hip displaced femoral neck fracture.    PROCEDURE DONE:  Open treatment of right hip displaced femoral neck fracture with bipolar press-fit hemiarthroplasty.    ANESTHESIA:  General anesthesia.    ESTIMATED BLOOD LOSS:  Less than 100 mL.    COMPLICATIONS:  None.    APPROACH:  Anterolateral approach.    IMPLANT USED:  Irons Accolade II stem size #5 with head size 46 mm bipolar, 0 offset.    INDICATION:  This is an 83-year-old white female, who still lives at home on her own, tripped at home and fell, landed on her right side.  She immediately had significant pain in the right hip and inability to bear weight.  She was brought by EMS to [place] [facility], where she was found to have a displaced right femoral neck fracture, and I was consulted for her injury.  All risks, benefits, and alternatives were discussed with the patient and her family, and they agreed to proceed with surgical treatment.    DESCRIPTION OF PROCEDURE:  The patient's right hip was marked.  She received 2 g Ancef IV preoperatively.  The patient was then brought to the operating room and underwent general anesthesia.  She was then placed on the left lateral decubitus position with the right hip up.  The right hip and lower extremity were then prepped and draped in regular sterile routine fashion.  A time-out was called confirming the patient's name and site of procedure.    We used an anterolateral approach.  An incision was made

## 2024-05-25 NOTE — CONSULTS
[facility] Orthopedic Surgery  Consult Note         This patient is seen in consultation at the request of Dr Orozco, Don Ghosh MD     Reason for Consult:  Right hip fracture.    CHIEF COMPLAINT:  Right hip pain/ fall    History Obtained From:  patient, family member - daughter, electronic medical record    HISTORY OF PRESENT ILLNESS:    Ms. Bolivar 83 y.o.  female seen today for consultation and evaluation of a right hip pain which occurred when she fell at her home.   She is complaining of right hip pain.  This is better with rest and worse with bearing any wt.  The pain is sharp and not radiating. No numbness or tingling sensation.  No other complaint. She was seen 1st at [facility], came via EMS, where she was x-rayed and I was consulted.     Past Medical History:        Diagnosis Date    Breast cancer (HCC) 2022    Right    Cancer (HCC) 2022    right breast    COVID-2021    Diabetes mellitus (HCC)     Hx of blood clots     in legs- on Coumadin    Hyperlipidemia     Hypertension     Nervous breakdown       - Dec 21    Pneumothorax     during surgery - in        Past Surgical History:        Procedure Laterality Date    BREAST BIOPSY Right 2022    RIGHT BREAST LUMPECTOMY/ RIGHT BREAST SENTINEL NODE BIOPSY performed by Chata Torres MD at [facility] OR    BREAST SURGERY Right 2022    RE-EXCISION RIGHT BREAST LUMPECTOMY performed by Chata Torres MD at [facility] OR    CHOLECYSTECTOMY      EYE SURGERY      cataract with IOL    HYSTERECTOMY (CERVIX STATUS UNKNOWN)      US BREAST BIOPSY W LOC DEVICE 1ST LESION RIGHT Right 2022    US BREAST NEEDLE BIOPSY RIGHT 6/3/2022 Estrella Moreno MD [facility] ULTRASOUND       Medications prior to admission:   Prior to Admission medications    Medication Sig Start Date End Date Taking? Authorizing Provider   sertraline (ZOLOFT) 25 MG tablet Take 1 tablet by mouth daily 23  Yes Provider, MD Delmis   carvedilol (COREG)

## 2024-05-25 NOTE — CARE COORDINATION
Discharge planning    5/25  Open treatment of right hip displaced femoral neck fracture     Therapy evals are pending. Patient will need a pre-cert if ARU/skilled nursing facility is required at discharge.  CM will continue to follow.

## 2024-05-25 NOTE — FLOWSHEET NOTE
05/25/24 1500   Vital Signs   Temp 97.9 °F (36.6 °C)   Temp Source Oral   Pulse 65   Heart Rate Source Monitor   Respirations 17   BP (!) 107/45   MAP (Calculated) 66   BP Location Left upper arm   Patient Position Semi fowlers   Pain Assessment   Pain Assessment 0-10   Pain Level 0   Oxygen Therapy   SpO2 96 %   O2 Device None (Room air)   O2 Flow Rate (L/min) 0 L/min       Reassessment completed; no  significant changes. Pt stable.    Mercedes Meraz RN

## 2024-05-26 LAB
ANION GAP SERPL CALCULATED.3IONS-SCNC: 10 MMOL/L (ref 3–16)
BUN SERPL-MCNC: 25 MG/DL (ref 7–20)
CALCIUM SERPL-MCNC: 7.8 MG/DL (ref 8.3–10.6)
CHLORIDE SERPL-SCNC: 100 MMOL/L (ref 99–110)
CO2 SERPL-SCNC: 22 MMOL/L (ref 21–32)
CREAT SERPL-MCNC: 1.4 MG/DL (ref 0.6–1.2)
GFR, ESTIMATED: 37 ML/MIN/1.73M2
GLUCOSE BLD-MCNC: 202 MG/DL (ref 70–99)
GLUCOSE BLD-MCNC: 203 MG/DL (ref 70–99)
GLUCOSE BLD-MCNC: 263 MG/DL (ref 70–99)
GLUCOSE BLD-MCNC: 281 MG/DL (ref 70–99)
GLUCOSE SERPL-MCNC: 209 MG/DL (ref 70–99)
HCT VFR BLD AUTO: 22.2 % (ref 36–48)
HCT VFR BLD AUTO: 23 % (ref 36–48)
HCT VFR BLD AUTO: 23.6 % (ref 36–48)
HCT VFR BLD AUTO: 25.1 % (ref 36–48)
HCT VFR BLD AUTO: 25.6 % (ref 36–48)
HGB BLD-MCNC: 6.7 G/DL (ref 12–16)
HGB BLD-MCNC: 7.1 G/DL (ref 12–16)
HGB BLD-MCNC: 7.3 G/DL (ref 12–16)
HGB BLD-MCNC: 7.6 G/DL (ref 12–16)
HGB BLD-MCNC: 8 G/DL (ref 12–16)
INR PPP: 1.5 (ref 0.9–1.2)
POTASSIUM SERPL-SCNC: 3.8 MMOL/L (ref 3.5–5.1)
PROTHROMBIN TIME: 18.4 SEC (ref 11.9–14.9)
SODIUM SERPL-SCNC: 133 MMOL/L (ref 136–145)

## 2024-05-26 PROCEDURE — 6360000002 HC RX W HCPCS: Performed by: ORTHOPAEDIC SURGERY

## 2024-05-26 PROCEDURE — 36415 COLL VENOUS BLD VENIPUNCTURE: CPT

## 2024-05-26 PROCEDURE — 85014 HEMATOCRIT: CPT

## 2024-05-26 PROCEDURE — 80048 BASIC METABOLIC PNL TOTAL CA: CPT

## 2024-05-26 PROCEDURE — 6360000002 HC RX W HCPCS: Performed by: INTERNAL MEDICINE

## 2024-05-26 PROCEDURE — 2580000003 HC RX 258: Performed by: ORTHOPAEDIC SURGERY

## 2024-05-26 PROCEDURE — 6370000000 HC RX 637 (ALT 250 FOR IP): Performed by: ORTHOPAEDIC SURGERY

## 2024-05-26 PROCEDURE — 6370000000 HC RX 637 (ALT 250 FOR IP): Performed by: NURSE PRACTITIONER

## 2024-05-26 PROCEDURE — 36430 TRANSFUSION BLD/BLD COMPNT: CPT

## 2024-05-26 PROCEDURE — 30233N1 TRANSFUSION OF NONAUTOLOGOUS RED BLOOD CELLS INTO PERIPHERAL VEIN, PERCUTANEOUS APPROACH: ICD-10-PCS | Performed by: ORTHOPAEDIC SURGERY

## 2024-05-26 PROCEDURE — 6360000002 HC RX W HCPCS: Performed by: NURSE PRACTITIONER

## 2024-05-26 PROCEDURE — 85018 HEMOGLOBIN: CPT

## 2024-05-26 PROCEDURE — 2580000003 HC RX 258

## 2024-05-26 PROCEDURE — 2060000000 HC ICU INTERMEDIATE R&B

## 2024-05-26 PROCEDURE — P9016 RBC LEUKOCYTES REDUCED: HCPCS

## 2024-05-26 PROCEDURE — 85610 PROTHROMBIN TIME: CPT

## 2024-05-26 PROCEDURE — 2580000003 HC RX 258: Performed by: INTERNAL MEDICINE

## 2024-05-26 PROCEDURE — 82962 GLUCOSE BLOOD TEST: CPT

## 2024-05-26 PROCEDURE — 51798 US URINE CAPACITY MEASURE: CPT

## 2024-05-26 RX ORDER — SODIUM CHLORIDE 9 MG/ML
INJECTION, SOLUTION INTRAVENOUS PRN
Status: DISCONTINUED | OUTPATIENT
Start: 2024-05-26 | End: 2024-05-29 | Stop reason: HOSPADM

## 2024-05-26 RX ORDER — WATER 10 ML/10ML
INJECTION INTRAMUSCULAR; INTRAVENOUS; SUBCUTANEOUS
Status: DISPENSED
Start: 2024-05-26 | End: 2024-05-26

## 2024-05-26 RX ORDER — SODIUM CHLORIDE 9 MG/ML
INJECTION, SOLUTION INTRAVENOUS CONTINUOUS
Status: DISCONTINUED | OUTPATIENT
Start: 2024-05-26 | End: 2024-05-29 | Stop reason: HOSPADM

## 2024-05-26 RX ADMIN — SODIUM CHLORIDE: 9 INJECTION, SOLUTION INTRAVENOUS at 20:37

## 2024-05-26 RX ADMIN — SODIUM CHLORIDE: 4.5 INJECTION, SOLUTION INTRAVENOUS at 00:49

## 2024-05-26 RX ADMIN — SERTRALINE HYDROCHLORIDE 25 MG: 25 TABLET ORAL at 08:22

## 2024-05-26 RX ADMIN — ENOXAPARIN SODIUM 40 MG: 100 INJECTION SUBCUTANEOUS at 08:22

## 2024-05-26 RX ADMIN — PANTOPRAZOLE SODIUM 40 MG: 40 TABLET, DELAYED RELEASE ORAL at 05:04

## 2024-05-26 RX ADMIN — ACETAMINOPHEN 650 MG: 325 TABLET ORAL at 05:04

## 2024-05-26 RX ADMIN — INSULIN LISPRO 1 UNITS: 100 INJECTION, SOLUTION INTRAVENOUS; SUBCUTANEOUS at 08:22

## 2024-05-26 RX ADMIN — ACETAMINOPHEN 650 MG: 325 TABLET ORAL at 12:30

## 2024-05-26 RX ADMIN — SODIUM CHLORIDE: 9 INJECTION, SOLUTION INTRAVENOUS at 12:07

## 2024-05-26 RX ADMIN — WATER 2000 MG: 1 INJECTION INTRAMUSCULAR; INTRAVENOUS; SUBCUTANEOUS at 00:42

## 2024-05-26 RX ADMIN — IRON SUCROSE 100 MG: 20 INJECTION, SOLUTION INTRAVENOUS at 08:22

## 2024-05-26 RX ADMIN — INSULIN LISPRO 1 UNITS: 100 INJECTION, SOLUTION INTRAVENOUS; SUBCUTANEOUS at 12:26

## 2024-05-26 RX ADMIN — INSULIN LISPRO 2 UNITS: 100 INJECTION, SOLUTION INTRAVENOUS; SUBCUTANEOUS at 17:51

## 2024-05-26 ASSESSMENT — PAIN DESCRIPTION - FREQUENCY
FREQUENCY: CONTINUOUS

## 2024-05-26 ASSESSMENT — PAIN SCALES - GENERAL
PAINLEVEL_OUTOF10: 0
PAINLEVEL_OUTOF10: 2
PAINLEVEL_OUTOF10: 5
PAINLEVEL_OUTOF10: 0
PAINLEVEL_OUTOF10: 0
PAINLEVEL_OUTOF10: 3

## 2024-05-26 ASSESSMENT — PAIN DESCRIPTION - ORIENTATION
ORIENTATION: RIGHT

## 2024-05-26 ASSESSMENT — PAIN DESCRIPTION - DESCRIPTORS
DESCRIPTORS: ACHING
DESCRIPTORS: ACHING;SHARP
DESCRIPTORS: ACHING;DISCOMFORT

## 2024-05-26 ASSESSMENT — PAIN - FUNCTIONAL ASSESSMENT
PAIN_FUNCTIONAL_ASSESSMENT: PREVENTS OR INTERFERES SOME ACTIVE ACTIVITIES AND ADLS
PAIN_FUNCTIONAL_ASSESSMENT: ACTIVITIES ARE NOT PREVENTED
PAIN_FUNCTIONAL_ASSESSMENT: ACTIVITIES ARE NOT PREVENTED

## 2024-05-26 ASSESSMENT — PAIN DESCRIPTION - LOCATION
LOCATION: HIP
LOCATION: KNEE;LEG
LOCATION: HIP

## 2024-05-26 ASSESSMENT — PAIN DESCRIPTION - PAIN TYPE
TYPE: SURGICAL PAIN;ACUTE PAIN
TYPE: ACUTE PAIN
TYPE: ACUTE PAIN

## 2024-05-26 ASSESSMENT — PAIN DESCRIPTION - ONSET
ONSET: AWAKENED FROM SLEEP
ONSET: ON-GOING
ONSET: ON-GOING

## 2024-05-26 NOTE — FLOWSHEET NOTE
05/26/24 0807   Vital Signs   Temp 98.6 °F (37 °C)   Temp Source Oral   Pulse 85   Heart Rate Source Monitor   Respirations 18   BP (!) 123/49   MAP (Calculated) 74   BP Location Right upper arm   BP Method Automatic   Patient Position Semi fowlers   Pain Assessment   Pain Assessment None - Denies Pain   Opioid-Induced Sedation   POSS Score 1   Oxygen Therapy   SpO2 94 %   O2 Device None (Room air)   Rhythm Interpretation   Cardiac Rhythm Sinus rhythm     Shift assessment completed, patient awake and alert lying in bed, morning medications given see MAR. Patient's williamson C/D/I draining with no kinks, R hip dressing C/D/I. Patient has no further concerns at this time. Call light within reach.

## 2024-05-26 NOTE — CONSENT
Informed Consent for Blood Component Transfusion Note    I have discussed with the patient the rationale for blood component transfusion; its benefits in treating or preventing fatigue, organ damage, or death; and its risk which includes mild transfusion reactions, rare risk of blood borne infection, or more serious but rare reactions. I have discussed the alternatives to transfusion, including the risk and consequences of not receiving transfusion. The patient had an opportunity to ask questions and had agreed to proceed with transfusion of blood components.    Electronically signed by Noam Tan MD on 5/26/24 at 4:18 PM EDT

## 2024-05-26 NOTE — CONSULTS
[facility]    Pharmacy Progress Note    Warfarin - Management by Pharmacy    Consult Date(s): 05/25/24  Consulting Provider(s): Kirti SANDOVAL-CNP    Assessment / Plan  Atrial Fibrillation - Warfarin  Goal INR: 2 - 3  Concurrent Anticoagulants / Antiplatelets: Enoxaparin DVT prophylaxis dosing per Ortho until INR is therapeutic  Interactions:   Amiodarone (typically increases INR, but patient is on this at home so do not expect any changes)  Current Regimen / Plan:   INR is 1.5 again this AM  Patient's home regimen is warfarin 3 mg daily, last dose was on 05/23/24  Patient also received a one time dose of vitamin K SQ 05/24/24. Given this was SQ, the kinetics of the vitamin K are not as reliable. This could affect the INR over the next few days, so would expect a slow rise back to therapeutic INR  Will order a slight bolus of warfarin 4.5 mg x1 for today  Will monitor pt's clinical status and INR daily, and make dose adjustments as needed.    Thank you for consulting pharmacy,    Jayesh Calderon, Naheed  [facility]   [id number]  5/26/2024   9:09 AM        Subjective/Objective:   Mary Bolivar is a 83 y.o. female with a PMHx significant for recurrent dizziness, Chronic HFrEF, PAF on Coumadin, provoked PE with DVT in 2015, Well Controlled NIDDM, Right Invasive Ductal Carcinoma s/p lumpectomy x 2 & Stage IIIa CKD  who is admitted with closed fracture of right femur.     Pharmacy is consulted to dose warfarin.    Ht Readings from Last 1 Encounters:   05/24/24 1.6 m (5' 3\")     Wt Readings from Last 1 Encounters:   05/26/24 66.7 kg (147 lb 1.6 oz)     Prior / Home Warfarin Regimen:  Patient appears to utilize home care services and self-tests her INR at home. Looks like last visit with home health care APRN was 02/20/24 and INR was not documented  Most recent home dose appears to be warfarin 3 mg daily    Date INR Warfarin Notes   5/24 1.7 -- 5 mg of Vitamin K given

## 2024-05-27 LAB
AMORPH SED URNS QL MICRO: PRESENT
ANION GAP SERPL CALCULATED.3IONS-SCNC: 10 MMOL/L (ref 3–16)
BACTERIA URNS QL MICRO: ABNORMAL
BASOPHILS # BLD: 0.03 K/UL (ref 0–0.2)
BASOPHILS NFR BLD: 0 %
BILIRUB UR QL STRIP: NEGATIVE
BUN SERPL-MCNC: 25 MG/DL (ref 7–20)
CALCIUM SERPL-MCNC: 7.5 MG/DL (ref 8.3–10.6)
CASTS #/AREA URNS LPF: ABNORMAL /LPF
CHARACTER UR: ABNORMAL
CHLORIDE SERPL-SCNC: 104 MMOL/L (ref 99–110)
CLARITY UR: CLEAR
CO2 SERPL-SCNC: 22 MMOL/L (ref 21–32)
COLOR UR: YELLOW
CREAT SERPL-MCNC: 1.2 MG/DL (ref 0.6–1.2)
EOSINOPHIL # BLD: 0 K/UL (ref 0–0.6)
EOSINOPHILS RELATIVE PERCENT: 0 %
EPI CELLS #/AREA URNS HPF: ABNORMAL /HPF
ERYTHROCYTE [DISTWIDTH] IN BLOOD BY AUTOMATED COUNT: 18.6 % (ref 12.4–15.4)
FOLATE SERPL-MCNC: 8.6 NG/ML (ref 4.8–24.2)
GFR, ESTIMATED: 44 ML/MIN/1.73M2
GLUCOSE BLD-MCNC: 159 MG/DL (ref 70–99)
GLUCOSE BLD-MCNC: 174 MG/DL (ref 70–99)
GLUCOSE BLD-MCNC: 183 MG/DL (ref 70–99)
GLUCOSE BLD-MCNC: 225 MG/DL (ref 70–99)
GLUCOSE SERPL-MCNC: 202 MG/DL (ref 70–99)
GLUCOSE UR STRIP-MCNC: NEGATIVE MG/DL
HCT VFR BLD AUTO: 24.3 % (ref 36–48)
HCT VFR BLD AUTO: 27.1 % (ref 36–48)
HGB BLD-MCNC: 7.6 G/DL (ref 12–16)
HGB BLD-MCNC: 8.4 G/DL (ref 12–16)
HGB UR QL STRIP.AUTO: ABNORMAL
IMM GRANULOCYTES # BLD AUTO: 0.18 K/UL (ref 0–0.5)
IMM GRANULOCYTES NFR BLD: 1 %
INR PPP: 1.6 (ref 0.9–1.2)
KETONES UR STRIP-MCNC: NEGATIVE MG/DL
LEUKOCYTE ESTERASE UR QL STRIP: NEGATIVE
LYMPHOCYTES NFR BLD: 0.77 K/UL (ref 1–5.1)
LYMPHOCYTES RELATIVE PERCENT: 5 %
MAGNESIUM SERPL-MCNC: 1.6 MG/DL (ref 1.8–2.4)
MCH RBC QN AUTO: 24.7 PG (ref 26–34)
MCHC RBC AUTO-ENTMCNC: 31.3 G/DL (ref 31–36)
MCV RBC AUTO: 78.9 FL (ref 80–100)
MONOCYTES NFR BLD: 13 %
MONOCYTES NFR BLD: 2.23 K/UL (ref 0–1.3)
NEUTROPHILS NFR BLD: 81 %
NEUTS SEG NFR BLD: 13.69 K/UL (ref 1.7–7.7)
NITRITE UR QL STRIP: NEGATIVE
PH UR STRIP: 5.5 [PH] (ref 5–8)
PLATELET # BLD AUTO: 78 K/UL (ref 135–450)
PLATELET CONFIRMATION: NORMAL
PLATELET ESTIMATE: NORMAL
PMV BLD AUTO: 10.5 FL
POTASSIUM SERPL-SCNC: 3.5 MMOL/L (ref 3.5–5.1)
PROT UR STRIP-MCNC: 30 MG/DL
PROTHROMBIN TIME: 19.6 SEC (ref 11.9–14.9)
RBC # BLD AUTO: 3.08 M/UL (ref 4–5.2)
RBC # BLD: NORMAL 10*6/UL
RBC #/AREA URNS HPF: ABNORMAL /HPF
SODIUM SERPL-SCNC: 136 MMOL/L (ref 136–145)
SP GR UR STRIP: >1.03 (ref 1–1.03)
UROBILINOGEN UR STRIP-ACNC: 0.2 EU/DL (ref 0–1)
VIT B12 SERPL-MCNC: >2000 PG/ML (ref 211–911)
WBC # BLD: NORMAL 10*3/UL
WBC #/AREA URNS HPF: ABNORMAL /HPF
WBC OTHER # BLD: 16.9 K/UL (ref 4–11)

## 2024-05-27 PROCEDURE — 97535 SELF CARE MNGMENT TRAINING: CPT

## 2024-05-27 PROCEDURE — 6370000000 HC RX 637 (ALT 250 FOR IP): Performed by: NURSE PRACTITIONER

## 2024-05-27 PROCEDURE — 6370000000 HC RX 637 (ALT 250 FOR IP): Performed by: INTERNAL MEDICINE

## 2024-05-27 PROCEDURE — 97530 THERAPEUTIC ACTIVITIES: CPT

## 2024-05-27 PROCEDURE — 85025 COMPLETE CBC W/AUTO DIFF WBC: CPT

## 2024-05-27 PROCEDURE — 82962 GLUCOSE BLOOD TEST: CPT

## 2024-05-27 PROCEDURE — 6360000002 HC RX W HCPCS: Performed by: ORTHOPAEDIC SURGERY

## 2024-05-27 PROCEDURE — 97116 GAIT TRAINING THERAPY: CPT

## 2024-05-27 PROCEDURE — 85014 HEMATOCRIT: CPT

## 2024-05-27 PROCEDURE — 85610 PROTHROMBIN TIME: CPT

## 2024-05-27 PROCEDURE — 81001 URINALYSIS AUTO W/SCOPE: CPT

## 2024-05-27 PROCEDURE — 85018 HEMOGLOBIN: CPT

## 2024-05-27 PROCEDURE — 36415 COLL VENOUS BLD VENIPUNCTURE: CPT

## 2024-05-27 PROCEDURE — 2580000003 HC RX 258: Performed by: NURSE PRACTITIONER

## 2024-05-27 PROCEDURE — 83735 ASSAY OF MAGNESIUM: CPT

## 2024-05-27 PROCEDURE — 51701 INSERT BLADDER CATHETER: CPT

## 2024-05-27 PROCEDURE — 6370000000 HC RX 637 (ALT 250 FOR IP): Performed by: ORTHOPAEDIC SURGERY

## 2024-05-27 PROCEDURE — 6360000002 HC RX W HCPCS: Performed by: NURSE PRACTITIONER

## 2024-05-27 PROCEDURE — 97162 PT EVAL MOD COMPLEX 30 MIN: CPT

## 2024-05-27 PROCEDURE — 87086 URINE CULTURE/COLONY COUNT: CPT

## 2024-05-27 PROCEDURE — 80048 BASIC METABOLIC PNL TOTAL CA: CPT

## 2024-05-27 PROCEDURE — 97166 OT EVAL MOD COMPLEX 45 MIN: CPT

## 2024-05-27 PROCEDURE — 2060000000 HC ICU INTERMEDIATE R&B

## 2024-05-27 RX ORDER — HYDROCODONE BITARTRATE AND ACETAMINOPHEN 7.5; 325 MG/1; MG/1
1 TABLET ORAL EVERY 6 HOURS PRN
Status: DISCONTINUED | OUTPATIENT
Start: 2024-05-27 | End: 2024-05-29 | Stop reason: HOSPADM

## 2024-05-27 RX ORDER — POLYETHYLENE GLYCOL 3350 17 G/17G
17 POWDER, FOR SOLUTION ORAL DAILY
Status: DISCONTINUED | OUTPATIENT
Start: 2024-05-27 | End: 2024-05-29 | Stop reason: HOSPADM

## 2024-05-27 RX ORDER — DOCUSATE SODIUM 100 MG/1
100 CAPSULE, LIQUID FILLED ORAL 2 TIMES DAILY
Status: DISCONTINUED | OUTPATIENT
Start: 2024-05-27 | End: 2024-05-29 | Stop reason: HOSPADM

## 2024-05-27 RX ORDER — SENNOSIDES A AND B 8.6 MG/1
2 TABLET, FILM COATED ORAL DAILY
Status: DISCONTINUED | OUTPATIENT
Start: 2024-05-27 | End: 2024-05-29 | Stop reason: HOSPADM

## 2024-05-27 RX ADMIN — POTASSIUM CHLORIDE 40 MEQ: 1500 TABLET, EXTENDED RELEASE ORAL at 06:03

## 2024-05-27 RX ADMIN — WARFARIN SODIUM 3 MG: 2 TABLET ORAL at 17:34

## 2024-05-27 RX ADMIN — HYDROCODONE BITARTRATE AND ACETAMINOPHEN 1 TABLET: 7.5; 325 TABLET ORAL at 18:31

## 2024-05-27 RX ADMIN — INSULIN LISPRO 1 UNITS: 100 INJECTION, SOLUTION INTRAVENOUS; SUBCUTANEOUS at 17:34

## 2024-05-27 RX ADMIN — SERTRALINE HYDROCHLORIDE 25 MG: 25 TABLET ORAL at 07:39

## 2024-05-27 RX ADMIN — PANTOPRAZOLE SODIUM 40 MG: 40 TABLET, DELAYED RELEASE ORAL at 05:19

## 2024-05-27 RX ADMIN — WATER 1000 MG: 1 INJECTION INTRAMUSCULAR; INTRAVENOUS; SUBCUTANEOUS at 17:33

## 2024-05-27 RX ADMIN — DOCUSATE SODIUM 100 MG: 100 CAPSULE, LIQUID FILLED ORAL at 12:39

## 2024-05-27 RX ADMIN — MAGNESIUM SULFATE HEPTAHYDRATE 2000 MG: 40 INJECTION, SOLUTION INTRAVENOUS at 10:35

## 2024-05-27 RX ADMIN — HYDROCODONE BITARTRATE AND ACETAMINOPHEN 1 TABLET: 7.5; 325 TABLET ORAL at 12:30

## 2024-05-27 RX ADMIN — ACETAMINOPHEN 650 MG: 325 TABLET ORAL at 08:39

## 2024-05-27 RX ADMIN — POLYETHYLENE GLYCOL 3350 17 G: 17 POWDER, FOR SOLUTION ORAL at 12:30

## 2024-05-27 RX ADMIN — IRON SUCROSE 200 MG: 20 INJECTION, SOLUTION INTRAVENOUS at 12:37

## 2024-05-27 RX ADMIN — ACETAMINOPHEN 650 MG: 325 TABLET ORAL at 02:22

## 2024-05-27 RX ADMIN — DOCUSATE SODIUM 100 MG: 100 CAPSULE, LIQUID FILLED ORAL at 19:48

## 2024-05-27 RX ADMIN — SENNOSIDES 17.2 MG: 8.6 TABLET, FILM COATED ORAL at 12:30

## 2024-05-27 RX ADMIN — AMIODARONE HYDROCHLORIDE 200 MG: 200 TABLET ORAL at 07:39

## 2024-05-27 ASSESSMENT — PAIN DESCRIPTION - DESCRIPTORS
DESCRIPTORS: ACHING;DISCOMFORT;SORE
DESCRIPTORS: ACHING
DESCRIPTORS: DISCOMFORT;SORE
DESCRIPTORS: SHARP
DESCRIPTORS: SHARP
DESCRIPTORS: ACHING;DISCOMFORT
DESCRIPTORS: ACHING;DISCOMFORT;SORE

## 2024-05-27 ASSESSMENT — PAIN DESCRIPTION - ONSET
ONSET: ON-GOING

## 2024-05-27 ASSESSMENT — PAIN DESCRIPTION - LOCATION
LOCATION: HIP
LOCATION: LEG;KNEE

## 2024-05-27 ASSESSMENT — PAIN DESCRIPTION - FREQUENCY
FREQUENCY: CONTINUOUS

## 2024-05-27 ASSESSMENT — PAIN DESCRIPTION - PAIN TYPE
TYPE: ACUTE PAIN

## 2024-05-27 ASSESSMENT — PAIN DESCRIPTION - ORIENTATION
ORIENTATION: RIGHT

## 2024-05-27 ASSESSMENT — PAIN SCALES - GENERAL
PAINLEVEL_OUTOF10: 6
PAINLEVEL_OUTOF10: 3
PAINLEVEL_OUTOF10: 0
PAINLEVEL_OUTOF10: 0
PAINLEVEL_OUTOF10: 7
PAINLEVEL_OUTOF10: 5
PAINLEVEL_OUTOF10: 0

## 2024-05-27 ASSESSMENT — PAIN - FUNCTIONAL ASSESSMENT
PAIN_FUNCTIONAL_ASSESSMENT: ACTIVITIES ARE NOT PREVENTED

## 2024-05-27 NOTE — FLOWSHEET NOTE
05/27/24 0719   Vital Signs   Temp 98.1 °F (36.7 °C)   Temp Source Oral   Pulse 81   Heart Rate Source Monitor   Respirations 16   BP (!) 141/61   MAP (Calculated) 88   MAP (mmHg) 83   BP Location Right upper arm   BP Method Automatic   Patient Position Semi fowlers   Oxygen Therapy   SpO2 96 %   O2 Device None (Room air)     Shift assessment completed, patient awake and alert lying in bed, morning medications given see MAR. PRN Tylenol given prior to therapy session per request. Fluids given per request. Patient denies any further concerns at this time. Call light within reach.

## 2024-05-27 NOTE — FLOWSHEET NOTE
Post transfusion   05/26/24 2028   Vitals   /61   Temp 98.4 °F (36.9 °C)   Temp Source Oral   Pulse 89   Respirations 18   SpO2 96 %

## 2024-05-28 LAB
ABO/RH: NORMAL
ANION GAP SERPL CALCULATED.3IONS-SCNC: 8 MMOL/L (ref 3–16)
ANTIBODY SCREEN: NEGATIVE
BASOPHILS # BLD: 0.06 K/UL (ref 0–0.2)
BASOPHILS NFR BLD: 0 %
BLOOD BANK BLOOD PRODUCT EXPIRATION DATE: NORMAL
BLOOD BANK DISPENSE STATUS: NORMAL
BLOOD BANK ISBT PRODUCT BLOOD TYPE: 6200
BLOOD BANK PRODUCT CODE: NORMAL
BLOOD BANK SAMPLE EXPIRATION: NORMAL
BLOOD BANK UNIT TYPE AND RH: NORMAL
BPU ID: NORMAL
BUN SERPL-MCNC: 17 MG/DL (ref 7–20)
CALCIUM SERPL-MCNC: 8 MG/DL (ref 8.3–10.6)
CHLORIDE SERPL-SCNC: 104 MMOL/L (ref 99–110)
CO2 SERPL-SCNC: 22 MMOL/L (ref 21–32)
COMPONENT: NORMAL
CREAT SERPL-MCNC: 1.1 MG/DL (ref 0.6–1.2)
CROSSMATCH RESULT: NORMAL
EOSINOPHIL # BLD: 0.03 K/UL (ref 0–0.6)
EOSINOPHILS RELATIVE PERCENT: 0 %
ERYTHROCYTE [DISTWIDTH] IN BLOOD BY AUTOMATED COUNT: 19.1 % (ref 12.4–15.4)
GFR, ESTIMATED: 49 ML/MIN/1.73M2
GLUCOSE BLD-MCNC: 153 MG/DL (ref 70–99)
GLUCOSE BLD-MCNC: 161 MG/DL (ref 70–99)
GLUCOSE BLD-MCNC: 187 MG/DL (ref 70–99)
GLUCOSE BLD-MCNC: 200 MG/DL (ref 70–99)
GLUCOSE SERPL-MCNC: 147 MG/DL (ref 70–99)
HCT VFR BLD AUTO: 24.3 % (ref 36–48)
HCT VFR BLD AUTO: 26.7 % (ref 36–48)
HGB BLD-MCNC: 7.6 G/DL (ref 12–16)
HGB BLD-MCNC: 8.2 G/DL (ref 12–16)
IMM GRANULOCYTES # BLD AUTO: 0.09 K/UL (ref 0–0.5)
IMM GRANULOCYTES NFR BLD: 1 %
INR PPP: 1.3 (ref 0.9–1.2)
LYMPHOCYTES NFR BLD: 1.17 K/UL (ref 1–5.1)
LYMPHOCYTES RELATIVE PERCENT: 8 %
MCH RBC QN AUTO: 24.8 PG (ref 26–34)
MCHC RBC AUTO-ENTMCNC: 31.3 G/DL (ref 31–36)
MCV RBC AUTO: 79.4 FL (ref 80–100)
MICROORGANISM SPEC CULT: NO GROWTH
MONOCYTES NFR BLD: 1.7 K/UL (ref 0–1.3)
MONOCYTES NFR BLD: 12 %
NEUTROPHILS NFR BLD: 78 %
NEUTS SEG NFR BLD: 11 K/UL (ref 1.7–7.7)
PLATELET # BLD AUTO: 96 K/UL (ref 135–450)
PLATELET CONFIRMATION: NORMAL
PMV BLD AUTO: 10.6 FL
POTASSIUM SERPL-SCNC: 4.3 MMOL/L (ref 3.5–5.1)
PROTHROMBIN TIME: 16.7 SEC (ref 11.9–14.9)
RBC # BLD AUTO: 3.06 M/UL (ref 4–5.2)
SODIUM SERPL-SCNC: 133 MMOL/L (ref 136–145)
SPECIMEN DESCRIPTION: NORMAL
TRANSFUSION STATUS: NORMAL
UNIT DIVISION: 0
UNIT ISSUE DATE/TIME: NORMAL
WBC OTHER # BLD: 14.1 K/UL (ref 4–11)

## 2024-05-28 PROCEDURE — 85610 PROTHROMBIN TIME: CPT

## 2024-05-28 PROCEDURE — 2580000003 HC RX 258: Performed by: INTERNAL MEDICINE

## 2024-05-28 PROCEDURE — 6360000002 HC RX W HCPCS: Performed by: NURSE PRACTITIONER

## 2024-05-28 PROCEDURE — 2580000003 HC RX 258: Performed by: ORTHOPAEDIC SURGERY

## 2024-05-28 PROCEDURE — 85025 COMPLETE CBC W/AUTO DIFF WBC: CPT

## 2024-05-28 PROCEDURE — 85018 HEMOGLOBIN: CPT

## 2024-05-28 PROCEDURE — 97110 THERAPEUTIC EXERCISES: CPT

## 2024-05-28 PROCEDURE — 80048 BASIC METABOLIC PNL TOTAL CA: CPT

## 2024-05-28 PROCEDURE — 85014 HEMATOCRIT: CPT

## 2024-05-28 PROCEDURE — 6370000000 HC RX 637 (ALT 250 FOR IP): Performed by: ORTHOPAEDIC SURGERY

## 2024-05-28 PROCEDURE — 82962 GLUCOSE BLOOD TEST: CPT

## 2024-05-28 PROCEDURE — 2060000000 HC ICU INTERMEDIATE R&B

## 2024-05-28 PROCEDURE — 36415 COLL VENOUS BLD VENIPUNCTURE: CPT

## 2024-05-28 PROCEDURE — 6370000000 HC RX 637 (ALT 250 FOR IP): Performed by: INTERNAL MEDICINE

## 2024-05-28 PROCEDURE — 97535 SELF CARE MNGMENT TRAINING: CPT

## 2024-05-28 PROCEDURE — 2580000003 HC RX 258: Performed by: NURSE PRACTITIONER

## 2024-05-28 PROCEDURE — 6370000000 HC RX 637 (ALT 250 FOR IP): Performed by: NURSE PRACTITIONER

## 2024-05-28 RX ORDER — AMLODIPINE BESYLATE 5 MG/1
5 TABLET ORAL DAILY
Status: DISCONTINUED | OUTPATIENT
Start: 2024-05-28 | End: 2024-05-29 | Stop reason: HOSPADM

## 2024-05-28 RX ADMIN — AMIODARONE HYDROCHLORIDE 200 MG: 200 TABLET ORAL at 07:44

## 2024-05-28 RX ADMIN — DOCUSATE SODIUM 100 MG: 100 CAPSULE, LIQUID FILLED ORAL at 20:24

## 2024-05-28 RX ADMIN — POLYETHYLENE GLYCOL 3350 17 G: 17 POWDER, FOR SOLUTION ORAL at 07:44

## 2024-05-28 RX ADMIN — WATER 1000 MG: 1 INJECTION INTRAMUSCULAR; INTRAVENOUS; SUBCUTANEOUS at 15:36

## 2024-05-28 RX ADMIN — SODIUM CHLORIDE: 9 INJECTION, SOLUTION INTRAVENOUS at 00:43

## 2024-05-28 RX ADMIN — DOCUSATE SODIUM 100 MG: 100 CAPSULE, LIQUID FILLED ORAL at 07:44

## 2024-05-28 RX ADMIN — HYDROCODONE BITARTRATE AND ACETAMINOPHEN 1 TABLET: 7.5; 325 TABLET ORAL at 22:30

## 2024-05-28 RX ADMIN — PANTOPRAZOLE SODIUM 40 MG: 40 TABLET, DELAYED RELEASE ORAL at 05:16

## 2024-05-28 RX ADMIN — AMLODIPINE BESYLATE 5 MG: 5 TABLET ORAL at 11:57

## 2024-05-28 RX ADMIN — SENNOSIDES 17.2 MG: 8.6 TABLET, FILM COATED ORAL at 07:44

## 2024-05-28 RX ADMIN — INSULIN LISPRO 1 UNITS: 100 INJECTION, SOLUTION INTRAVENOUS; SUBCUTANEOUS at 17:23

## 2024-05-28 RX ADMIN — Medication 10 ML: at 07:48

## 2024-05-28 RX ADMIN — HYDROCODONE BITARTRATE AND ACETAMINOPHEN 1 TABLET: 7.5; 325 TABLET ORAL at 05:16

## 2024-05-28 RX ADMIN — WARFARIN SODIUM 4.5 MG: 2.5 TABLET ORAL at 17:23

## 2024-05-28 RX ADMIN — HYDROCODONE BITARTRATE AND ACETAMINOPHEN 1 TABLET: 7.5; 325 TABLET ORAL at 15:42

## 2024-05-28 RX ADMIN — SERTRALINE HYDROCHLORIDE 25 MG: 25 TABLET ORAL at 07:44

## 2024-05-28 RX ADMIN — IRON SUCROSE 200 MG: 20 INJECTION, SOLUTION INTRAVENOUS at 07:44

## 2024-05-28 ASSESSMENT — PAIN DESCRIPTION - DESCRIPTORS
DESCRIPTORS: ACHING
DESCRIPTORS: SHARP
DESCRIPTORS: ACHING;SORE;DISCOMFORT
DESCRIPTORS: SHARP;SQUEEZING

## 2024-05-28 ASSESSMENT — PAIN DESCRIPTION - LOCATION
LOCATION: HIP

## 2024-05-28 ASSESSMENT — PAIN DESCRIPTION - FREQUENCY
FREQUENCY: CONTINUOUS

## 2024-05-28 ASSESSMENT — PAIN - FUNCTIONAL ASSESSMENT
PAIN_FUNCTIONAL_ASSESSMENT: INTOLERABLE, UNABLE TO DO ANY ACTIVE OR PASSIVE ACTIVITIES
PAIN_FUNCTIONAL_ASSESSMENT: ACTIVITIES ARE NOT PREVENTED
PAIN_FUNCTIONAL_ASSESSMENT: ACTIVITIES ARE NOT PREVENTED
PAIN_FUNCTIONAL_ASSESSMENT: PREVENTS OR INTERFERES SOME ACTIVE ACTIVITIES AND ADLS

## 2024-05-28 ASSESSMENT — PAIN SCALES - GENERAL
PAINLEVEL_OUTOF10: 10
PAINLEVEL_OUTOF10: 0
PAINLEVEL_OUTOF10: 6
PAINLEVEL_OUTOF10: 5
PAINLEVEL_OUTOF10: 0
PAINLEVEL_OUTOF10: 5
PAINLEVEL_OUTOF10: 0
PAINLEVEL_OUTOF10: 0
PAINLEVEL_OUTOF10: 8
PAINLEVEL_OUTOF10: 5

## 2024-05-28 ASSESSMENT — PAIN DESCRIPTION - ORIENTATION
ORIENTATION: RIGHT

## 2024-05-28 ASSESSMENT — PAIN DESCRIPTION - ONSET
ONSET: ON-GOING

## 2024-05-28 ASSESSMENT — PAIN DESCRIPTION - PAIN TYPE
TYPE: SURGICAL PAIN
TYPE: ACUTE PAIN
TYPE: ACUTE PAIN

## 2024-05-28 NOTE — DISCHARGE SUMMARY
Hospital Medicine Discharge Summary    Patient ID: Mary Bolivar      Patient's PCP: Russ Aponte DO    Admit Date: 5/24/2024     Discharge Date:   05/29/2024    Admitting Physician: Skip Weir DO     Discharge Physician: Laura Murphy APRN - CNP     Discharge Diagnoses  Right hip displaced femoral neck fracture s/p right hip hemiarthroplasty  Mechanical fall from chair  Acute blood loss anemia  Anemia of chronic disease  Hypertensive urgency   DIAZ   Urine retention   UTI   Leukocytosis  Pseudo hypocalcemia  Hypomagnesia  Type 2 diabetes  History A-fib  Past medical history right breast cancer, hypertension, CKD stage III, stenosis of lumbar sacral spine, hypothyroidism, Barnhart's esophagus, hyperlipidemia, B12 deficiency       Hospital Course: Mary Bolivar is a 83 y.o.  female  PMH of recurrent dizziness, Chronic HFrEF, PAF on Coumadin, provoked PE with DVT in 2015, Well Controlled NIDDM, Right Invasive Ductal Carcinoma s/p lumpectomy x 2 & Stage IIIa CKD presented to the [facility] ED via EMS s/p fall from a chair with right hip pain. She ambulates with a cane. Patient was sitting at the dinning room table, doing her bills all day. She got up and sat some of the envelopes on her kitchen counter and when she went to sit back down at the table the chair slide out from under her and she fell on her right side. She was unable to get up and called her daughter who was on her way home from work. She came over and called EMS.  Right hip x-ray showed basicervical right hip fracture.  Patient admitted for treatment.  Orthopedic surgery consulted.     5/25/2024: to OR for right hip fracture repair    Right hip displaced femoral neck fracture s/p right hip hemiarthroplasty  Mechanical fall from chair  -Ortho consulted, 5/25/24 right hip hemiarthroplasty. WBAT. Ok to dc to ECF from ortho standpoint. F/U with Dr Alvarez in 2 weeks   -PT OT recommend snf  -continue norco 7.5mg po q6hr prn pain for pain

## 2024-05-28 NOTE — OR NURSING
Verified OR chart/ omit verification for preop from previous charting. Unable to edit previous documentation in preop charting.

## 2024-05-28 NOTE — CARE COORDINATION
Called and spoke with pt son Nico and daughter Karina. Reviewed SNF list with family per phone and they would like to research facilities and they will be calling me later today with SNF choices. Cm to continue to follow pt d/c needs.

## 2024-05-28 NOTE — DISCHARGE INSTRUCTIONS
redness.  This could signify a clot formation.  Numbness or tingling to an area around the incision site or below the incision site (toes).  Any rash appears, increased  or new onset nausea/vomiting occur.  This may indicate a reaction to a medication.   Phone # [phone].  [facility].  Follow up with Surgeon at scheduled appointment time.   I acknowledge that I have received juan hose and understand the instructions on how and when to wear them   __________________________________  Discharging RN who has gone over instructions and acknowledges juan hose have been received   ____________________________________________  Please remove Blue [employer] wrist band on Post Operative Day #4  Please continue to use your Incentive Spirometer at home every hour while awake.    Warfarin dosing-on day of discharge 5/29 PT/INR 17.1/1.4.  Pharmacy recommended dosing 3 mg warfarin tablets x 3 days, order for PT and INR daily, follow-up CBC every 3 days.  Further dosing as per facility provider.      Pklnlk-pc-zuyjpmfpmx surgery  Whstxg-gx-wjuembqchr  Wefads-hc-gydskgk care provider

## 2024-05-28 NOTE — FLOWSHEET NOTE
05/28/24 0725   Vital Signs   Temp 98.2 °F (36.8 °C)   Temp Source Oral   Pulse 83   Heart Rate Source Monitor   Respirations 16   BP (!) 173/76   MAP (Calculated) 108   MAP (mmHg) 103   BP Location Right upper arm   BP Method Automatic   Patient Position Semi fowlers   Pain Assessment   Pain Assessment None - Denies Pain   Oxygen Therapy   SpO2 95 %   O2 Device None (Room air)     Shift assessment completed, patient awake and alert sitting up in bed, morning medications given see MAR. Fluids given per request. Patient has no further concerns at this time. Call light within reach and bed alarm on.

## 2024-05-28 NOTE — CARE COORDINATION
Pt son Nico called back and stated they would like [facility]. I called [facility] and made referral. Cm to continue to follow pt d/c needs.

## 2024-05-29 VITALS
SYSTOLIC BLOOD PRESSURE: 158 MMHG | BODY MASS INDEX: 27.78 KG/M2 | HEIGHT: 63 IN | WEIGHT: 156.8 LBS | OXYGEN SATURATION: 98 % | RESPIRATION RATE: 18 BRPM | TEMPERATURE: 98.1 F | HEART RATE: 70 BPM | DIASTOLIC BLOOD PRESSURE: 76 MMHG

## 2024-05-29 LAB
ANION GAP SERPL CALCULATED.3IONS-SCNC: 10 MMOL/L (ref 3–16)
BASOPHILS # BLD: 0.04 K/UL (ref 0–0.2)
BASOPHILS NFR BLD: 0 %
BUN SERPL-MCNC: 14 MG/DL (ref 7–20)
CALCIUM SERPL-MCNC: 8.1 MG/DL (ref 8.3–10.6)
CHLORIDE SERPL-SCNC: 104 MMOL/L (ref 99–110)
CO2 SERPL-SCNC: 22 MMOL/L (ref 21–32)
CREAT SERPL-MCNC: 0.9 MG/DL (ref 0.6–1.2)
EOSINOPHIL # BLD: 0.02 K/UL (ref 0–0.6)
EOSINOPHILS RELATIVE PERCENT: 0 %
ERYTHROCYTE [DISTWIDTH] IN BLOOD BY AUTOMATED COUNT: 19.6 % (ref 12.4–15.4)
GFR, ESTIMATED: 64 ML/MIN/1.73M2
GLUCOSE BLD-MCNC: 131 MG/DL (ref 70–99)
GLUCOSE BLD-MCNC: 177 MG/DL (ref 70–99)
GLUCOSE SERPL-MCNC: 144 MG/DL (ref 70–99)
HCT VFR BLD AUTO: 26 % (ref 36–48)
HGB BLD-MCNC: 8.2 G/DL (ref 12–16)
IMM GRANULOCYTES # BLD AUTO: 0.06 K/UL (ref 0–0.5)
IMM GRANULOCYTES NFR BLD: 1 %
INR PPP: 1.4 (ref 0.9–1.2)
LYMPHOCYTES NFR BLD: 1.16 K/UL (ref 1–5.1)
LYMPHOCYTES RELATIVE PERCENT: 10 %
MCH RBC QN AUTO: 25.1 PG (ref 26–34)
MCHC RBC AUTO-ENTMCNC: 31.5 G/DL (ref 31–36)
MCV RBC AUTO: 79.5 FL (ref 80–100)
MONOCYTES NFR BLD: 1.89 K/UL (ref 0–1.3)
MONOCYTES NFR BLD: 17 %
NEUTROPHILS NFR BLD: 72 %
NEUTS SEG NFR BLD: 8.05 K/UL (ref 1.7–7.7)
PLATELET # BLD AUTO: 125 K/UL (ref 135–450)
PLATELET CONFIRMATION: NORMAL
PLATELET ESTIMATE: NORMAL
PMV BLD AUTO: 10.8 FL
POTASSIUM SERPL-SCNC: 3.9 MMOL/L (ref 3.5–5.1)
PROTHROMBIN TIME: 17.1 SEC (ref 11.9–14.9)
RBC # BLD AUTO: 3.27 M/UL (ref 4–5.2)
RBC # BLD: ABNORMAL 10*6/UL
SODIUM SERPL-SCNC: 135 MMOL/L (ref 136–145)
WBC # BLD: NORMAL 10*3/UL
WBC OTHER # BLD: 11.2 K/UL (ref 4–11)

## 2024-05-29 PROCEDURE — 80048 BASIC METABOLIC PNL TOTAL CA: CPT

## 2024-05-29 PROCEDURE — 6370000000 HC RX 637 (ALT 250 FOR IP): Performed by: INTERNAL MEDICINE

## 2024-05-29 PROCEDURE — 85610 PROTHROMBIN TIME: CPT

## 2024-05-29 PROCEDURE — 97530 THERAPEUTIC ACTIVITIES: CPT

## 2024-05-29 PROCEDURE — 82962 GLUCOSE BLOOD TEST: CPT

## 2024-05-29 PROCEDURE — 94760 N-INVAS EAR/PLS OXIMETRY 1: CPT

## 2024-05-29 PROCEDURE — 36415 COLL VENOUS BLD VENIPUNCTURE: CPT

## 2024-05-29 PROCEDURE — 6370000000 HC RX 637 (ALT 250 FOR IP): Performed by: ORTHOPAEDIC SURGERY

## 2024-05-29 PROCEDURE — 6370000000 HC RX 637 (ALT 250 FOR IP): Performed by: NURSE PRACTITIONER

## 2024-05-29 PROCEDURE — 85025 COMPLETE CBC W/AUTO DIFF WBC: CPT

## 2024-05-29 RX ORDER — FERROUS SULFATE 325(65) MG
325 TABLET ORAL
Status: DISCONTINUED | OUTPATIENT
Start: 2024-05-29 | End: 2024-05-29 | Stop reason: HOSPADM

## 2024-05-29 RX ORDER — WARFARIN SODIUM 3 MG/1
3 TABLET ORAL DAILY
Qty: 3 TABLET | Refills: 0 | Status: SHIPPED | OUTPATIENT
Start: 2024-05-29

## 2024-05-29 RX ORDER — AMLODIPINE BESYLATE 5 MG/1
5 TABLET ORAL DAILY
Qty: 30 TABLET | Refills: 3 | Status: SHIPPED | OUTPATIENT
Start: 2024-05-30

## 2024-05-29 RX ORDER — DOCUSATE SODIUM 100 MG/1
100 CAPSULE, LIQUID FILLED ORAL 2 TIMES DAILY
Qty: 60 CAPSULE | Refills: 0
Start: 2024-05-29 | End: 2024-06-28

## 2024-05-29 RX ORDER — FERROUS SULFATE 325(65) MG
325 TABLET ORAL 2 TIMES DAILY
Qty: 60 TABLET | Refills: 0
Start: 2024-05-29

## 2024-05-29 RX ADMIN — WARFARIN SODIUM 4.5 MG: 2.5 TABLET ORAL at 16:14

## 2024-05-29 RX ADMIN — DOCUSATE SODIUM 100 MG: 100 CAPSULE, LIQUID FILLED ORAL at 10:19

## 2024-05-29 RX ADMIN — SERTRALINE HYDROCHLORIDE 25 MG: 25 TABLET ORAL at 10:19

## 2024-05-29 RX ADMIN — PANTOPRAZOLE SODIUM 40 MG: 40 TABLET, DELAYED RELEASE ORAL at 05:58

## 2024-05-29 RX ADMIN — AMIODARONE HYDROCHLORIDE 200 MG: 200 TABLET ORAL at 10:19

## 2024-05-29 RX ADMIN — HYDROCODONE BITARTRATE AND ACETAMINOPHEN 1 TABLET: 7.5; 325 TABLET ORAL at 11:29

## 2024-05-29 RX ADMIN — POLYETHYLENE GLYCOL 3350 17 G: 17 POWDER, FOR SOLUTION ORAL at 10:19

## 2024-05-29 RX ADMIN — AMLODIPINE BESYLATE 5 MG: 5 TABLET ORAL at 10:19

## 2024-05-29 RX ADMIN — SENNOSIDES 17.2 MG: 8.6 TABLET, FILM COATED ORAL at 10:19

## 2024-05-29 RX ADMIN — HYDROCODONE BITARTRATE AND ACETAMINOPHEN 1 TABLET: 7.5; 325 TABLET ORAL at 17:36

## 2024-05-29 RX ADMIN — FERROUS SULFATE TAB 325 MG (65 MG ELEMENTAL FE) 325 MG: 325 (65 FE) TAB at 14:18

## 2024-05-29 ASSESSMENT — PAIN DESCRIPTION - LOCATION
LOCATION: HIP
LOCATION: HIP

## 2024-05-29 ASSESSMENT — PAIN SCALES - WONG BAKER
WONGBAKER_NUMERICALRESPONSE: NO HURT
WONGBAKER_NUMERICALRESPONSE: HURTS A LITTLE BIT
WONGBAKER_NUMERICALRESPONSE: NO HURT
WONGBAKER_NUMERICALRESPONSE: NO HURT

## 2024-05-29 ASSESSMENT — PAIN SCALES - GENERAL
PAINLEVEL_OUTOF10: 8
PAINLEVEL_OUTOF10: 8
PAINLEVEL_OUTOF10: 0

## 2024-05-29 ASSESSMENT — PAIN DESCRIPTION - ORIENTATION
ORIENTATION: RIGHT
ORIENTATION: RIGHT

## 2024-05-29 ASSESSMENT — PAIN DESCRIPTION - DESCRIPTORS
DESCRIPTORS: STABBING
DESCRIPTORS: SHARP;STABBING

## 2024-05-29 NOTE — PROGRESS NOTES
Hospitalist Progress Note      Name:  Mary Bolivar /Age/Sex: 1940  (83 y.o. female)   MRN & CSN:  [id number] & [id number] Admission Date/Time: 2024  6:35 PM   Location:  3204/3204-01 PCP: Russ Aponte DO         Hospital Day: 3    Assessment and Plan:   Mary Bolivar is a 83 y.o.  female with past medical history of dizziness/chronic HFpEF/paroxysmal atrial fibrillation on chronic anticoagulation with Coumadin, history of PE/DVT , DM2, history of right invasive ductal carcinoma status postlumpectomy x 2, CKD stage IIIa, was admitted on 2024 after suffering a fall from chair resulting in right hip fracture.  Seen by orthopedics and underwent right hip hemiarthroplasty on 2024.    Assessment    Right hip displaced femoral neck fracture status post mechanical fall  Acute on chronic anemia likely secondary to postoperative blood loss  Uncontrolled hypertension  DM2  History of paroxysmal atrial fibrillation-rate controlled  Mild DIAZ on CKD stage III  Hypothyroidism  Hyperlipidemia  History of Barnhart's esophagus    Plan    Telemetry monitoring  Orthopedics management appreciated.  Patient underwent right hip hemiarthroplasty on 2024.  PT/OT eval  Hemoglobin 7.6 g <-- ~9g.  Will repeat.  Maintain hemoglobin greater than 7 g  Continue amiodarone/Coumadin.  Pharmacy to dose Coumadin.  Okay per orthopedics to resume anticoagulation.  Hold Lovenox  Continue telemetry monitoring  Creatinine slightly elevated at 1.4.  Baseline serum creatinine 0.9.  Will start IV fluids.  Continue insulin sliding scale  Hold oral hypoglycemics metformin/pioglitazone.  Hold losartan given DIAZ.  Continue Protonix  Continue sertraline      Diet ADULT DIET; Regular   DVT Prophylaxis [] Lovenox, []  Heparin, [] SCDs, [] Ambulation, Coumadin   GI Prophylaxis [] PPI,  [] H2 Blocker,  [] Carafate,  [] Diet/Tube Feeds   Code Status Full Code   Disposition Patient requires continued admission due to PT/OT 
      Hospitalist Progress Note      PCP: Russ Aponte DO    Date of Admission: 5/24/2024    Chief Complaint: Right hip pain after falling out of chair    Hospital Course: Mary Bolivar is a 83 y.o.  female  PMH of recurrent dizziness, Chronic HFrEF, PAF on Coumadin, provoked PE with DVT in 2015, Well Controlled NIDDM, Right Invasive Ductal Carcinoma s/p lumpectomy x 2 & Stage IIIa CKD presented to the [facility] ED via EMS s/p fall from a chair with right hip pain. She ambulates with a cane. Patient was sitting at the dinning room table, doing her bills all day. She got up and sat some of the envelopes on her kitchen counter and when she went to sit back down at the table the chair slide out from under her and she fell on her right side. She was unable to get up and called her daughter who was on her way home from work. She came over and called EMS.  Right hip x-ray showed basicervical right hip fracture.  Patient admitted for treatment.  Orthopedic surgery consulted.    5/25/2024: to OR for right hip fracture repair    Subjective: Pt laying in bed, agreeable to get up to BSC. Encouraged pt to get OOB for all  meals. Pt hesitant due to pain, but agreeable. Denies cp sob palpiations abd pain. Reviewed POC, denies needs. No family at BS.    Assessment/Plan:    Right hip displaced femoral neck fracture s/p right hip hemiarthroplasty  Mechanical fall from chair  -Ortho consulted, 5/25/24 right hip hemiarthroplasty. WBAT. Ok to dc to ECF from ortho standpoint. F/U with Dr Alvarez in 2 weeks   -PT OT eval postop still needs done  -DC iv morphine dilaudid- has not used in 2 days.   -been receiving tylenol prn for pain, pt states pain is 10 with movement.   -add norco 7.5mg po q6hr prn pain for pain management  -add bowel regimen: colace/miralax, senna  -Daily labs    Acute blood loss anemia  Anemia of chronic disease  -Baseline hemoglobin seems to be around 9.8, 5/26/24 hgb dropped to 6.7. S/P 1 unit prbc 
      Hospitalist Progress Note      PCP: Russ Aponte DO    Date of Admission: 5/24/2024    Chief Complaint: Right hip pain after falling out of chair    Hospital Course: Mary Bolivar is a 83 y.o.  female  PMH of recurrent dizziness, Chronic HFrEF, PAF on Coumadin, provoked PE with DVT in 2015, Well Controlled NIDDM, Right Invasive Ductal Carcinoma s/p lumpectomy x 2 & Stage IIIa CKD presented to the [facility] ED via EMS s/p fall from a chair with right hip pain. She ambulates with a cane. Patient was sitting at the dinning room table, doing her bills all day. She got up and sat some of the envelopes on her kitchen counter and when she went to sit back down at the table the chair slide out from under her and she fell on her right side. She was unable to get up and called her daughter who was on her way home from work. She came over and called EMS.  Right hip x-ray showed basicervical right hip fracture.  Patient admitted for treatment.  Orthopedic surgery consulted.    5/25/2024: to OR for right hip fracture repair    Subjective: Pt laying in bed, was using bed pain. Encouraged pt to get OOB for all  meals and toileting. Pt hesitant due to pain, but agreeable. Needs much encouragement. Denies cp sob palpiations abd pain. Reviewed POC, denies needs. No family at BS.    1620 called daughter Karina to give update on phone, no answer,  left.     Assessment/Plan:    Right hip displaced femoral neck fracture s/p right hip hemiarthroplasty  Mechanical fall from chair  -Ortho consulted, 5/25/24 right hip hemiarthroplasty. WBAT. Ok to dc to ECF from ortho standpoint. F/U with Dr Alvarez in 2 weeks   -PT OT eval postop still needs done  -continue norco 7.5mg po q6hr prn pain for pain management  -continue bowel regimen: colace/miralax, senna  -Daily labs    Acute blood loss anemia  Anemia of chronic disease  -Baseline hemoglobin seems to be around 9.8, 5/26/24 hgb dropped to 6.7. S/P 1 unit prbc 
   05/27/24 1353   Encounter Summary   Encounter Overview/Reason Attempted Encounter   Service Provided For Patient not available   Referral/Consult From Rounding   Last Encounter  05/27/24  (Patient was with staff/CK)       
   05/28/24 1445   Encounter Summary   Encounter Overview/Reason Attempted Encounter   Service Provided For Patient not available   Referral/Consult From Rounding   Last Encounter  05/28/24  (Patient was on the phone/CK)       
   05/29/24 1426   Encounter Summary   Encounter Overview/Reason Attempted Encounter   Service Provided For Patient not available   Referral/Consult From Rounding   Last Encounter  05/29/24  (Patient was with staff/CK)       
  Physician Progress Note      PATIENT:               DERECK MARCIAL  CSN #:                  [id number]  :                       1940  ADMIT DATE:       2024 6:35 PM  DISCH DATE:  RESPONDING  PROVIDER #:        FLORIDALMA TOPETE          QUERY TEXT:    Patient noted to have atrial fibrillation and is maintained on coumadin. If   possible, please document in progress notes and discharge summary if you are   evaluating and/or treating any of the following:    The medical record reflects the following:  Risk Factors: A-fib, aflutter, htn urgency  Clinical Indicators: Per H&P, \"PAF on coumadin\"  Treatment: Resuming coumadin  Options provided:  -- Secondary hypercoagulable state in a patient with atrial fibrillation  -- Other - I will add my own diagnosis  -- Disagree - Not applicable / Not valid  -- Disagree - Clinically unable to determine / Unknown  -- Refer to Clinical Documentation Reviewer    PROVIDER RESPONSE TEXT:    This patient has secondary hypercoagulable state in a patient with atrial   fibrillation.    Query created by: Dinga Cano on 2024 6:31 AM      Electronically signed by:  FLORIDALMA TOPETE 2024 7:24 AM          
  [facility] - Rehabilitation Department      Physical Therapy  3204/3204-01  [facility] 3 PROGRESSIVE CARE    [] Initial Evaluation            [x] Daily Treatment Note         [] Discharge Summary      Patient: Mary Bolivar   : 1940   MRN: [id number]   Date of Service:  2024   Admitting Diagnosis: Closed fracture of neck of right femur (HCC)  Ordering Physician: Dr. Alvarez  Current Admission Summary: Mary oBlivar is a 83 y.o. female who presents to the ED complaining of R hip pain after falling and missing the chair she was trying to sit on. Pt found to have a R hip fx and underwent R bipolar hemiarthroplasty on .  Past Medical History:  has a past medical history of Breast cancer (HCC), Cancer (HCC), COVID-19, Diabetes mellitus (HCC), Hx of blood clots, Hyperlipidemia, Hypertension, Nervous breakdown, and Pneumothorax.  Past Surgical History:  has a past surgical history that includes Hysterectomy; US BREAST BIOPSY W LOC DEVICE 1ST LESION RIGHT (Right, 2022); eye surgery; Cholecystectomy; Breast biopsy (Right, 2022); Breast surgery (Right, 2022); and hip surgery (Right, 2024).  ______________________________________________________________________________________________________________________________________________  Discharge Recommendations: SNF  AMPAC Raw Score: 11  AM-PAC Inpatient Mobility Raw Score : 11            Therapy discharge recommendations take into account each patient's current medical complexities and are subject to input/oversight from the patient's healthcare team.   Barriers to Home Discharge:   [x] Steps to access home entry or bed/bath:   [x] Unable to transfer, ambulate, or propel wheelchair household distances without assist   [] Limited available assist at home upon discharge    [] Patient or family requests d/c to post-acute facility    [x] Poor cognition/safety awareness for d/c to home alone    []Unable to maintain ordered weight bearing 
  [facility] - Rehabilitation Department      Physical Therapy  3204/3204-01  [facility] 3 PROGRESSIVE CARE    [] Initial Evaluation            [x] Daily Treatment Note         [] Discharge Summary      Patient: Mary Bolivar   : 1940   MRN: [id number]   Date of Service:  2024   Admitting Diagnosis: Closed fracture of neck of right femur (HCC)  Ordering Physician: Dr. Alvarez  Current Admission Summary: Mary Bolivar is a 83 y.o. female who presents to the ED complaining of R hip pain after falling and missing the chair she was trying to sit on. Pt found to have a R hip fx and underwent R bipolar hemiarthroplasty on .  Past Medical History:  has a past medical history of Breast cancer (HCC), Cancer (HCC), COVID-19, Diabetes mellitus (HCC), Hx of blood clots, Hyperlipidemia, Hypertension, Nervous breakdown, and Pneumothorax.  Past Surgical History:  has a past surgical history that includes Hysterectomy; US BREAST BIOPSY W LOC DEVICE 1ST LESION RIGHT (Right, 2022); eye surgery; Cholecystectomy; Breast biopsy (Right, 2022); and Breast surgery (Right, 2022).  ______________________________________________________________________________________________________________________________________________  Discharge Recommendations: SNF  AMPAC Raw Score: 11  AM-PAC Inpatient Mobility Raw Score : 11            Therapy discharge recommendations take into account each patient's current medical complexities and are subject to input/oversight from the patient's healthcare team.   Barriers to Home Discharge:   [x] Steps to access home entry or bed/bath:   [x] Unable to transfer, ambulate, or propel wheelchair household distances without assist   [] Limited available assist at home upon discharge    [] Patient or family requests d/c to post-acute facility    [x] Poor cognition/safety awareness for d/c to home alone    []Unable to maintain ordered weight bearing status    [] Patient with 
4 Eyes Skin Assessment     NAME:  Mary Bolivar  YOB: 1940  MEDICAL RECORD NUMBER:  [id number]    The patient is being assessed for  Admission    I agree that at least one RN has performed a thorough Head to Toe Skin Assessment on the patient. ALL assessment sites listed below have been assessed.      Areas assessed by both nurses:    Head, Face, Ears, Shoulders, Back, Chest, Arms, Elbows, Hands, Sacrum. Buttock, Coccyx, Ischium, and Legs. Feet and Heels        Does the Patient have a Wound? Yes wound(s) were present on assessment. LDA wound assessment was Initiated and completed by RN       Stephan Prevention initiated by RN: No  Wound Care Orders initiated by RN: Yes    Pressure Injury (Stage 3,4, Unstageable, DTI, NWPT, and Complex wounds) if present, place Wound referral order by RN under : No    New Ostomies, if present place, Ostomy referral order under : No     Nurse 1 eSignature: Electronically signed by Fatmata Brewer RN on 5/25/24 at 6:51 AM EDT    **SHARE this note so that the co-signing nurse can place an eSignature**    Nurse 2 eSignature: Electronically signed by Alyce Kennedy RN on 5/25/24 at 6:54 AM EDT    
Bedside report and transfer of care given to XIANG Zamarripa. Pt currently resting in bed with the call light within reach. Pt denies any other care needs at this time. Pt stable at this time.      Mercedes Meraz RN    
Bedside report received from XIANG Avilez. OR called for an updated. Addressed plan of care with patient and family at bedside. Pt O/Ax4. No s/s of distress. Lab in to draw type and screen. Transported to OR in stable condition.    Mercedes Meraz RN    
Discharge instructions reviewed with patient. Verbalized understanding. IV dc'd without complications. Tele box returned to nurse's station. Patient to be taken to transport entrance via stretcher with [facility].   
Discussed anticoagulation with Dr Alvarez. Per discussion it is Ok to resume Coumadin and Ok continue lovenox 40mg sq daily until INR is 2.0 or above, for dvt prophylaxis.  
[facility]    Pharmacy Progress Note    Warfarin - Management by Pharmacy    Consult Date(s): 05/25/24  Consulting Provider(s): Kirti POWELL    Assessment / Plan  Atrial Fibrillation - Warfarin  Goal INR: 2 - 3  Concurrent Anticoagulants / Antiplatelets: None at this time. Enoxaparin DVT prophylaxis on hold.  Interactions:   Amiodarone (typically increases INR, but patient is on this at home so do not expect any changes)  Current Regimen / Plan:   INR is 1.3 this AM. S/p 1 unit PRBC on 5/26/24. Hgb has improved.  Patient's home regimen is warfarin 3 mg daily.  Per nursing, patient has been eating about 50-75% of meals today.  Patient also received a one time dose of vitamin K SQ 05/24/24. Given this was SQ, the kinetics of the vitamin K are variable.Patient also did not receive a dose of warfarin on 05/26/24 due to low hemoglobin, so would expect taking at least 7 days or more for patient to become therapeutic.  Given patient did not receive warfarin on 05/26/24, will order slight bolus  patient warfarin 4.5 mg x1 today  Will monitor pt's clinical status and INR daily, and make dose adjustments as needed.    Thank you for consulting pharmacy,    Jyaesh Calderon PharmD  [facility]   [id number]  5/28/2024   12:55 PM      Subjective/Objective:   Mary Bolivar is a 83 y.o. female with a PMHx significant for recurrent dizziness, Chronic HFrEF, PAF on Coumadin, provoked PE with DVT in 2015, Well Controlled NIDDM, Right Invasive Ductal Carcinoma s/p lumpectomy x 2 & Stage IIIa CKD  who is admitted with closed fracture of right femur.     Pharmacy is consulted to dose warfarin.    Ht Readings from Last 1 Encounters:   05/24/24 1.6 m (5' 3\")     Wt Readings from Last 1 Encounters:   05/28/24 67.8 kg (149 lb 8 oz)     Prior / Home Warfarin Regimen:  Patient appears to utilize home care services and self-tests her INR at home. Looks like last visit with home health 
Incentive Spirometry education and demonstration completed by Respiratory Therapy.  Turning over to Nursing for routine follow-up.  Minimum Predicted Vital Capacity - 748 mL.  Patient's Actual Vital Capacity - 1000 mL.   
Loss of iv access, notified charge nurse, unable to run fluids at this time. Will notify md.   
[facility]    Pharmacy Progress Note    Warfarin - Management by Pharmacy    Consult Date(s): 05/25/24  Consulting Provider(s): Kirti POWELL    Assessment / Plan  Atrial Fibrillation - Warfarin  Goal INR: 2 - 3  Concurrent Anticoagulants / Antiplatelets: None at this time. Enoxaparin DVT prophylaxis on hold.  Interactions:   Amiodarone (typically increases INR, but patient is on this at home so do not expect any changes)  Held on 5/25 and 5/26. Resumed this morning.  Oral amiodarone chronic therapy mean half-life range is 40 to 55 days  Current Regimen / Plan:   INR is 1.6 this AM. S/p 1 unit PRBC on 5/26/24. Hgb has improved.  Patient's home regimen is warfarin 3 mg daily.  Per nursing, patient has been eating about 50-75% of meals today.  Patient also received a one time dose of vitamin K SQ 05/24/24. Given this was SQ, the kinetics of the vitamin K are variable. This could affect the INR over the next few days, so would expect a slow rise back to therapeutic INR  Will order one time dose of warfarin 3 mg today. Platelets are low at 78 today and an adverse reaction of Lovenox is thrombocytopenia. Per attending, plan is to hold Lovenox at this time.  Will monitor pt's clinical status and INR daily, and make dose adjustments as needed.    Thank you for consulting pharmacy,    Rossy Romero, Naheed  [facility]   Main Pharmacy = x [phone]  5/27/2024   3:51 PM      Subjective/Objective:   Mary Bolivar is a 83 y.o. female with a PMHx significant for recurrent dizziness, Chronic HFrEF, PAF on Coumadin, provoked PE with DVT in 2015, Well Controlled NIDDM, Right Invasive Ductal Carcinoma s/p lumpectomy x 2 & Stage IIIa CKD  who is admitted with closed fracture of right femur.     Pharmacy is consulted to dose warfarin.    Ht Readings from Last 1 Encounters:   05/24/24 1.6 m (5' 3\")     Wt Readings from Last 1 Encounters:   05/27/24 67.2 kg (148 lb 3.2 oz) 
Occupational Therapy    [facility] - Rehabilitation Department       Occupational Therapy  3204/3204-01  [facility] 3 PROGRESSIVE CARE    [] Initial Evaluation            [x] Daily Treatment Note         [] Discharge Summary      Patient: Mary Bolivar   : 1940   MRN: [id number]   Date of Service:  2024    Admitting Diagnosis:  Closed fracture of neck of right femur (HCC)  Referring Physician: Jona Alvarez MD   Current Admission Summary: Per note from Kirti Oanh APRN-CNP :   \"Date of Admission: 2024     Chief Complaint: Right hip pain after falling out of chair     Hospital Course: Mary Bolivar is a 83 y.o.  female  PMH of recurrent dizziness, Chronic HFrEF, PAF on Coumadin, provoked PE with DVT in , Well Controlled NIDDM, Right Invasive Ductal Carcinoma s/p lumpectomy x 2 & Stage IIIa CKD presented to the [facility] ED via EMS s/p fall from a chair with right hip pain. She ambulates with a cane. Patient was sitting at the dinning room table, doing her bills all day. She got up and sat some of the envelopes on her kitchen counter and when she went to sit back down at the table the chair slide out from under her and she fell on her right side. She was unable to get up and called her daughter who was on her way home from work. She came over and called EMS.  Right hip x-ray showed basicervical right hip fracture.  Patient admitted for treatment.  Orthopedic surgery consulted.     2024: to OR for right hip fracture repair     Subjective: Pt laying in bed, agreeable to get up to BSC. Encouraged pt to get OOB for all  meals. Pt hesitant due to pain, but agreeable. Denies cp sob palpiations abd pain. Reviewed POC, denies needs. No family at BS.     Assessment/Plan:     Right hip displaced femoral neck fracture s/p right hip hemiarthroplasty  Mechanical fall from chair  -Ortho consulted, 24 right hip hemiarthroplasty. WBAT. Ok to dc to ECF from ortho standpoint. 
Occupational Therapy  Attempt     Attempted to see patient this PM for occupational therapy session. Pt polietely declines participation, educated on importance of working with therapy while admitted. Pt states she is \"going to the nursing home for more therapy today\". Discussed with RN. Will continue to follow and re-attempt tomorrow if patient does not discharge. Thank you.     Kim Torres, OT     
[facility] Orthopedic Surgery  Progress Note        POD # 1, s/p right hip hemiarthroplasty.    Pt comfortable, no c/o.  Drsg right hip D/C/I,  Mild pain with right hip ROM, NVI    CBC:   Lab Results   Component Value Date/Time    WBC 5.6 05/25/2024 04:28 AM    RBC 4.02 05/25/2024 04:28 AM    HGB 7.3 05/26/2024 08:56 AM    HCT 23.6 05/26/2024 08:56 AM    MCV 76.6 05/25/2024 04:28 AM    MCH 23.6 05/25/2024 04:28 AM    MCHC 30.8 05/25/2024 04:28 AM    RDW 19.2 05/25/2024 04:28 AM     05/25/2024 04:28 AM    MPV 10.1 05/25/2024 04:28 AM     PT/INR:    Lab Results   Component Value Date/Time    PROTIME 18.4 05/26/2024 04:39 AM    INR 1.5 05/26/2024 04:39 AM     PTT:  No results found for: \"APTT\"[APTT    A/P: s/p right hip hemiarthroplasty.  - Stable  - PT/OT, WBAT  - Ok to D/C to ECF from ortho standpoint.  - F/U Dr Alvarez in 2 weeks.      Jona Alvarez MD 5/26/2024 11:07 AM    
Oreilly catheter removed per patient request and MD order, michael care provided and purewick in place.   
Patient admitted to PACU 06 in preparation for surgery, VSS. Consent confirmed. IV  Belongings in room.   
Physical Therapy/Occupational Therapy   Refusal     Orders received and chart reviewed. PT/OT attempted to evaluate pt this AM. Upon arrival, pt sleeping, easily aroused. After introducing ourselves and explaining role of PT/OT pt refusing all therapy interventions and eval at this time. Pt reporting increased pain and requesting PT/OT come back on 5/26. Pt asked if she would be open for therapy at attempt later this date and pt refusing. PT/OT will attempt to evaluate pt on 5/27 as appropriate. RN aware.       Gaudencio Valles, PT, DPT   [place], OTR/L [id number]    
[facility] Orthopedic Surgery  Progress Note        POD # 2, s/p right hip hemiarthroplasty.    Pt comfortable, no c/o.  Drsg right hip D/C/I,  Mild pain with right hip ROM, NVI    CBC:   Lab Results   Component Value Date/Time    WBC 16.9 05/27/2024 04:53 AM    RBC 3.08 05/27/2024 04:53 AM    HGB 7.6 05/27/2024 04:53 AM    HCT 24.3 05/27/2024 04:53 AM    MCV 78.9 05/27/2024 04:53 AM    MCH 24.7 05/27/2024 04:53 AM    MCHC 31.3 05/27/2024 04:53 AM    RDW 18.6 05/27/2024 04:53 AM    PLT 78 05/27/2024 04:53 AM    MPV 10.5 05/27/2024 04:53 AM     PT/INR:    Lab Results   Component Value Date/Time    PROTIME 19.6 05/27/2024 04:53 AM    INR 1.6 05/27/2024 04:53 AM     PTT:  No results found for: \"APTT\"[APTT    A/P: s/p right hip hemiarthroplasty.  - Stable  - PT/OT, WBAT  - Ok to D/C to ECF from ortho standpoint.  - F/U Dr Alvarez in 2 weeks.      Jona Alvarez MD 5/27/2024 1:22 PM    
Pt VSS throughout the night. NSR on the telemonitor. Remained on RA. Assessed pain utilizing pain scale 0-10, receiving pain medication per MAR. Pt receiving IV NS at 100 mL/hr. Standard safety precautions in place with hourly roundng- call light within reach, bed alarm on, brakes locked, bed in lowest position. Pt A/Ox3, I's and O's during night shift, bladder scanned patient-straight cath d/t pt being unable to urinate. Bladder scanned pt at 0530- 130.    
Pt VSS throughout the night. NSR on the telemonitor. Remained on RA. Assessed pain utilizing pain scale 0-10, receiving pain medication per MAR. Pt receiving IV NS. Standard safety precautions in place with hourly roundng- call light within reach, bed alarm on, brakes locked, bed in lowest position. Pt A/Ox4,.  
Pt back to room in stable condition. Pt rates pain8/10 and appears more comfortable. Nausea better. Circ cks remain positive. Dressing to right hip dry and intact ice in place. Glasses back to room with pt -pt left all other belongings in room. Scds on. Xray was done in pacu.  
Returned call to daughter, and she did not answer the phone. Left another .  
UA resulted with bacteria, wbc, and RBC. Culture sent. Start rocephin 1gm IV daily    Hgb recheck 8.4, plt 78 today. Will resume coumadin, but hold on lovenox bridge.   
[facility] [facility]    Pharmacy Progress Note    Warfarin - Management by Pharmacy    Consult Date(s): 05/25/24  Consulting Provider(s): Kirti POWELL    Assessment / Plan  Atrial Fibrillation - Warfarin  Goal INR: 2 - 3  Concurrent Anticoagulants / Antiplatelets: None at this time. Enoxaparin DVT prophylaxis on hold.  Interactions:   Amiodarone (typically increases INR, but patient is on this at home so do not expect any changes)  Current Regimen / Plan:   INR is 1.4 this afternoon. S/p 1 unit PRBC on 5/26/24. Hgb stable. Platelets have improved.  Patient's home regimen is warfarin 3 mg daily.  Patient received a one time dose of vitamin K SQ 05/24/24. Given this was SQ, the kinetics of the vitamin K are variable. Patient also did not receive a dose of warfarin on 05/26/24 due to low hemoglobin, so would expect taking at least 7 days or more for patient to become therapeutic.  Patient received a bolus dose of warfarin 4.5 mg yesterday. Most likely not seeing the full effects of this dose today.  Will dose warfarin 4.5 mg today  Upon discharge: Would recommend resuming the patient's home regimen of warfarin 3 mg daily without bridging at this time and check INR after discharge.  Will monitor pt's clinical status and INR daily, and make dose adjustments as needed.    Thank you for consulting pharmacy,    Rossy Romero, PharmRAJAT  [facility]   Main Pharmacy = x [phone]  5/29/2024   2:45 PM      Subjective/Objective:   Mary Bolivar is a 83 y.o. female with a PMHx significant for recurrent dizziness, Chronic HFrEF, PAF on Coumadin, provoked PE with DVT in 2015, Well Controlled NIDDM, Right Invasive Ductal Carcinoma s/p lumpectomy x 2 & Stage IIIa CKD  who is admitted with closed fracture of right femur.     Pharmacy is consulted to dose warfarin.    Ht Readings from Last 1 Encounters:   05/24/24 1.6 m (5' 3\")     Wt Readings from Last 1 Encounters:   05/29/24 71.1 kg (156 
5-40 mL IntraVENous 2 times per day     PRN Meds: hydrALAZINE, glucose, dextrose bolus **OR** dextrose bolus, glucagon (rDNA), dextrose, sodium chloride flush, sodium chloride, potassium chloride **OR** potassium alternative oral replacement **OR** potassium chloride, magnesium sulfate, ondansetron **OR** ondansetron, melatonin, senna, aluminum & magnesium hydroxide-simethicone, acetaminophen **OR** acetaminophen, magnesium hydroxide, morphine      Intake/Output Summary (Last 24 hours) at 5/25/2024 0814  Last data filed at 5/25/2024 0405  Gross per 24 hour   Intake --   Output 805 ml   Net -805 ml       Physical Exam Performed:    BP (!) 166/95   Pulse 77   Temp 98 °F (36.7 °C) (Infrared)   Resp 17   Ht 1.6 m (5' 3\")   Wt 66.4 kg (146 lb 7.7 oz)   SpO2 96%   BMI 25.95 kg/m²     General appearance: No apparent distress, appears stated age and cooperative.  HEENT: Pupils equal, round, and reactive to light. Conjunctivae/corneas clear.  Neck: Supple, with full range of motion. No jugular venous distention. Trachea midline.  Respiratory:  Normal respiratory effort. Clear to auscultation, bilaterally without Rales/Wheezes/Rhonchi.  Cardiovascular: Regular rate and rhythm with normal S1/S2 without murmurs, rubs or gallops.  Abdomen: Soft, non-tender, non-distended with normal bowel sounds.  Musculoskeletal: Right hip pain, with shortening of right leg  Skin: Skin color, texture, turgor normal.  No rashes or lesions.  Neurologic:  Neurovascularly intact without any focal sensory/motor deficits. Cranial nerves: II-XII intact, grossly non-focal.  Psychiatric: Alert and oriented, thought content appropriate, normal insight  Capillary Refill: Brisk,< 3 seconds   Peripheral Pulses: +2 palpable, equal bilaterally       Labs:   Recent Labs     05/24/24 1840 05/25/24 0428   WBC 4.7 5.6   HGB 9.1* 9.5*   HCT 29.2* 30.8*   * 107*     Recent Labs     05/24/24 1840 05/25/24 0428    137   K 4.4 3.7    102 
cues for safety, small B steps, flexed trunk, c/o pain and fatigue with gait      Balance  Static Sitting:   Pt requires SBA to maintain: [x] unsupported[] with bed rail[] in chair.  Dynamic Sitting:   Pt requires Not Tested to maintain: [] unsupported[] with bed rail[] in chair.  Comments:     Static Standing:  Pt requires Min A  to maintain: [x] with assistive device [] without assistive device. Of 2  Dynamic Standing:   Pt requires   Min A  to maintain: [x] with assistive device [] without assistive device.  Of 2  Comments:     Other Therapeutic Interventions  Pt positioned in bed to comfort, HOB elevated and pt set up for her lunch    Functional Outcomes                 Cognition  Overall Cognitive Status: Impaired  Orientation:    oriented to person and disoriented to situation  Command Following:   accurately follows one step commands    Education  Barriers To Learning: cognition  Patient Education: patient educated on plan of care, discharge recommendations, PT role and benefits  Learning Assessment:  patient verbalizes and demonstrates understanding, patient will require reinforcement due to cognitive deficits    Patient Disposition at end of session:  patient left in bed, bed alarm in place, call light within reach, and nurse notified    Plan  Frequency 5-7  Current Treatment Recommendations: strengthening, ROM, balance training, functional mobility training, transfer training, and gait training    Goals  Patient Goals: Go to bathroom     Goals :   To be met in by:Discharge unless noted otherwise:  1). Independent with LE Ex x 10 reps to maintain/improve mobility  2). Sit to/from stand:Min A   3). Bed to chair: Min A   4). Gait: Ambulate 50 ft.CGA and use of wheeled walker      Above goals reviewed on 5/27/2024.  All goals are ongoing at this time unless indicated above.      Therapy Session Time      Individual Group Co-treatment   Time In  0825       Time Out  0910       Minutes  45         Timed Code 
Comments: max A for pericare, min A x2 for transfer with use of RW to [facility]  General Comments: declined further ADL needs  Instrumental Activities of Daily Living  No IADL completed on this date.    Functional Mobility  Bed Mobility:  Supine to Sit: 2 person assistance with mod A   Sit to Supine: 2 person assistance with mod A   Comments:  Transfers:  Sit to stand transfer:2 person assistance with mod A, RW   Stand pivot transfer: 2 person assistance with min A, RW   Comments: to [facility]  Functional Mobility  Device Use: rolling walker  Required Assistance: minimal assistance, 2 person assistance with min A   Balance:  Static Sitting Balance: fair (+): maintains balance at SBA/supervision without use of UE support  Dynamic Standing Balance: poor (+): requires min (A) to maintain balance  Comments: with RW, Ax2      Other Therapeutic Interventions:  .    __________________________________________________________________________________     Education  Barriers To Learning: cognition  Patient Education: patient educated on goals, OT role and benefits, plan of care, ADL adaptive strategies, weight-bearing education, proper use of assistive device/equipment, disease specific education, transfer training, discharge recommendations  Learning Assessment:  patient verbalizes understanding, would benefit from continued reinforcement        Patient Disposition at end of session:  patient left in bed, bed alarm in place, call light within reach, gait belt, patient at risk for falls, and nurse notified    Plan  Frequency: 2-5x/per week  Current Treatment Recommendations: strengthening, balance training, functional mobility training, transfer training, endurance training, ADL/self-care training, home management training, pain management, safety education, and equipment evaluation/education    Goals  Patient Goals: Pt did not state   Short Term Goals:  Time Frame: By discharge unless noted otherwise  Patient will complete lower body ADL

## 2024-05-29 NOTE — CARE COORDINATION
Discharge Note     Discharge order received.  This patient will discharge home with no needs.     Destination: [facility]   [address], [place] [address] [phone]      Transportation: The S Vehicle you requested for Mary NUNESGillian in unit/room [id number] on 05/29/2024 is scheduled to arrive at 5:00pm EDT! [employer] is handling this ride and you can contact them at [phone].     All case management needs met.    [facility]   [address], [place] [address] [phone]

## 2024-05-29 NOTE — DISCHARGE INSTR - COC
Mobility/ADLs:  Walking   {CHP DME ADLs:782018626}  Transfer  {CHP DME ADLs:116263126}  Bathing  {CHP DME ADLs:376546585}  Dressing  {CHP DME ADLs:130422026}  Toileting  {CHP DME ADLs:105162043}  Feeding  {CHP DME ADLs:120270622}  Med Admin  {P DME ADLs:834073754}  Med Delivery   {[facility] MED Delivery:588012279}    Wound Care Documentation and Therapy:  Wound 05/24/24 Arm Lower;Posterior;Right (Active)   Wound Image   05/24/24 2200   Wound Etiology Skin Tear 05/29/24 1524   Dressing Status Clean;Dry;Intact 05/29/24 1524   Wound Cleansed Cleansed with saline 05/28/24 1443   Dressing/Treatment Hydrating gel;Non adherent;Roll gauze 05/29/24 1524   Wound Assessment Bleeding 05/24/24 2200   Drainage Amount Moderate (25-50%) 05/24/24 2200   Drainage Description Sanguinous 05/24/24 2200   Odor None 05/28/24 1947   Rosaura-wound Assessment Fragile 05/28/24 1947   Number of days: 4       Incision 08/19/22 Breast Right (Active)   Number of days: 649       Incision 05/25/24 Proximal;Right (Active)   Dressing Status Clean;Dry;Intact 05/29/24 1524   Dressing Change Due 06/01/24 05/29/24 1524   Incision Cleansed Cleansed with saline 05/29/24 1524   Dressing/Treatment Silver dressing;Steri-strips 05/29/24 1524   Closure Steri-Strips 05/29/24 1524   Margins Approximated 05/28/24 2242   Incision Assessment Bleeding 05/28/24 2242   Drainage Amount Moderate (25-50%) 05/28/24 2242   Drainage Description Thin;Black 05/28/24 2242   Odor None 05/29/24 1524   Rosaura-incision Assessment Ecchymosis 05/29/24 1524   Number of days: 4        Elimination:  Continence:   Bowel: {YES / NO:19727}  Bladder: {YES / NO:19727}  Urinary Catheter: {Urinary Catheter:309102247}   Colostomy/Ileostomy/Ileal Conduit: {YES / NO:88776}       Date of Last BM: ***    Intake/Output Summary (Last 24 hours) at 5/29/2024 1533  Last data filed at 5/29/2024 1125  Gross per 24 hour   Intake --   Output 900 ml   Net -900 ml     I/O last 3 completed shifts:  In: 1079.9

## 2024-05-29 NOTE — PLAN OF CARE
Problem: Discharge Planning  Goal: Discharge to home or other facility with appropriate resources  5/25/2024 1035 by Mercedes Meraz RN  Outcome: Progressing  5/25/2024 0043 by Fatmata Brewer RN  Outcome: Progressing  Flowsheets (Taken 5/24/2024 2312)  Discharge to home or other facility with appropriate resources:   Identify barriers to discharge with patient and caregiver   Arrange for needed discharge resources and transportation as appropriate   Refer to discharge planning if patient needs post-hospital services based on physician order or complex needs related to functional status, cognitive ability or social support system   Identify discharge learning needs (meds, wound care, etc)     Problem: Pain  Goal: Verbalizes/displays adequate comfort level or baseline comfort level  5/25/2024 1035 by Mercedes Meraz RN  Outcome: Progressing  5/25/2024 0043 by Fatmata Brewer RN  Outcome: Not Progressing     Problem: Safety - Adult  Goal: Free from fall injury  5/25/2024 1035 by Mercedes Meraz RN  Outcome: Progressing  5/25/2024 0043 by Fatmata Brewer RN  Outcome: Progressing     Problem: ABCDS Injury Assessment  Goal: Absence of physical injury  5/25/2024 1035 by Mercedes Meraz RN  Outcome: Progressing  5/25/2024 0043 by Fatmata Brewer RN  Outcome: Progressing     Problem: Chronic Conditions and Co-morbidities  Goal: Patient's chronic conditions and co-morbidity symptoms are monitored and maintained or improved  Outcome: Progressing     Problem: Skin/Tissue Integrity  Goal: Absence of new skin breakdown  Description: 1.  Monitor for areas of redness and/or skin breakdown  2.  Assess vascular access sites hourly  3.  Every 4-6 hours minimum:  Change oxygen saturation probe site  4.  Every 4-6 hours:  If on nasal continuous positive airway pressure, respiratory therapy assess nares and determine need for appliance change or resting period.  Outcome: Progressing     Problem: Pain  Goal: Verbalizes/displays 
  Problem: Discharge Planning  Goal: Discharge to home or other facility with appropriate resources  5/25/2024 2013 by Marilou Cunha, RN  Outcome: Progressing  Flowsheets (Taken 5/25/2024 1930)  Discharge to home or other facility with appropriate resources:   Identify barriers to discharge with patient and caregiver   Arrange for needed discharge resources and transportation as appropriate   Identify discharge learning needs (meds, wound care, etc)   Arrange for interpreters to assist at discharge as needed   Refer to discharge planning if patient needs post-hospital services based on physician order or complex needs related to functional status, cognitive ability or social support system     Problem: Pain  Goal: Verbalizes/displays adequate comfort level or baseline comfort level  5/25/2024 2013 by Marilou Cunha, RN  Outcome: Progressing     Problem: ABCDS Injury Assessment  Goal: Absence of physical injury  5/25/2024 2013 by Marilou Cunha, RN  Outcome: Progressing  Flowsheets (Taken 5/25/2024 1930)  Absence of Physical Injury: Implement safety measures based on patient assessment     Problem: Chronic Conditions and Co-morbidities  Goal: Patient's chronic conditions and co-morbidity symptoms are monitored and maintained or improved  5/25/2024 2013 by Marilou Cunha, RN  Outcome: Progressing  Flowsheets (Taken 5/25/2024 1930)  Care Plan - Patient's Chronic Conditions and Co-Morbidity Symptoms are Monitored and Maintained or Improved:   Monitor and assess patient's chronic conditions and comorbid symptoms for stability, deterioration, or improvement   Collaborate with multidisciplinary team to address chronic and comorbid conditions and prevent exacerbation or deterioration   Update acute care plan with appropriate goals if chronic or comorbid symptoms are exacerbated and prevent overall improvement and discharge     Problem: Skin/Tissue Integrity  Goal: Absence of new skin 
  Problem: Discharge Planning  Goal: Discharge to home or other facility with appropriate resources  5/26/2024 1943 by Marilou Cunha RN  Outcome: Progressing     Problem: Pain  Goal: Verbalizes/displays adequate comfort level or baseline comfort level  5/26/2024 1943 by Marilou Cunha RN  Outcome: Progressing     Problem: Safety - Adult  Goal: Free from fall injury  5/26/2024 1943 by Marilou Cunha RN  Outcome: Progressing  Flowsheets (Taken 5/26/2024 1908)  Free From Fall Injury:   Instruct family/caregiver on patient safety   Based on caregiver fall risk screen, instruct family/caregiver to ask for assistance with transferring infant if caregiver noted to have fall risk factors     Problem: ABCDS Injury Assessment  Goal: Absence of physical injury  5/26/2024 1943 by Marilou Cunha RN  Outcome: Progressing  Flowsheets (Taken 5/26/2024 1908)  Absence of Physical Injury: Implement safety measures based on patient assessment     Problem: Chronic Conditions and Co-morbidities  Goal: Patient's chronic conditions and co-morbidity symptoms are monitored and maintained or improved  5/26/2024 1943 by Marilou Cunha RN  Outcome: Progressing     Problem: Skin/Tissue Integrity  Goal: Absence of new skin breakdown  Description: 1.  Monitor for areas of redness and/or skin breakdown  2.  Assess vascular access sites hourly  3.  Every 4-6 hours minimum:  Change oxygen saturation probe site  4.  Every 4-6 hours:  If on nasal continuous positive airway pressure, respiratory therapy assess nares and determine need for appliance change or resting period.  5/26/2024 1943 by Marilou Cunha RN  Outcome: Progressing     
  Problem: Discharge Planning  Goal: Discharge to home or other facility with appropriate resources  5/27/2024 0904 by Mercedes Walls RN  Outcome: Progressing  Flowsheets (Taken 5/27/2024 0904)  Discharge to home or other facility with appropriate resources:   Identify barriers to discharge with patient and caregiver   Identify discharge learning needs (meds, wound care, etc)   Arrange for needed discharge resources and transportation as appropriate  5/26/2024 1943 by Marilou Cunha, RN  Outcome: Progressing     Problem: Pain  Goal: Verbalizes/displays adequate comfort level or baseline comfort level  5/27/2024 0904 by Mercedes Walls RN  Outcome: Progressing  Flowsheets (Taken 5/27/2024 0904)  Verbalizes/displays adequate comfort level or baseline comfort level:   Encourage patient to monitor pain and request assistance   Administer analgesics based on type and severity of pain and evaluate response   Assess pain using appropriate pain scale   Implement non-pharmacological measures as appropriate and evaluate response  5/26/2024 1943 by Marilou Cunha RN  Outcome: Progressing     Problem: Safety - Adult  Goal: Free from fall injury  5/27/2024 0904 by Mercedes Walls, RN  Outcome: Progressing  Flowsheets (Taken 5/27/2024 0904)  Free From Fall Injury: Instruct family/caregiver on patient safety  5/26/2024 1943 by Marilou Cunha, RN  Outcome: Progressing  Flowsheets (Taken 5/26/2024 1908)  Free From Fall Injury:   Instruct family/caregiver on patient safety   Based on caregiver fall risk screen, instruct family/caregiver to ask for assistance with transferring infant if caregiver noted to have fall risk factors     Problem: ABCDS Injury Assessment  Goal: Absence of physical injury  5/27/2024 0904 by Mercedes Walls, RN  Outcome: Progressing  Flowsheets (Taken 5/27/2024 0904)  Absence of Physical Injury: Implement safety measures based on patient assessment  5/26/2024 1943 by 
  Problem: Discharge Planning  Goal: Discharge to home or other facility with appropriate resources  5/27/2024 2027 by Marilou Cunha RN  Outcome: Progressing  Flowsheets (Taken 5/27/2024 1948)  Discharge to home or other facility with appropriate resources:   Identify barriers to discharge with patient and caregiver   Arrange for needed discharge resources and transportation as appropriate   Identify discharge learning needs (meds, wound care, etc)   Arrange for interpreters to assist at discharge as needed   Refer to discharge planning if patient needs post-hospital services based on physician order or complex needs related to functional status, cognitive ability or social support system     Problem: Pain  Goal: Verbalizes/displays adequate comfort level or baseline comfort level  5/27/2024 2027 by Marilou Cunha RN  Outcome: Progressing     Problem: Safety - Adult  Goal: Free from fall injury  5/27/2024 2027 by Marilou Cunha RN  Outcome: Progressing  Flowsheets (Taken 5/27/2024 1948)  Free From Fall Injury:   Instruct family/caregiver on patient safety   Based on caregiver fall risk screen, instruct family/caregiver to ask for assistance with transferring infant if caregiver noted to have fall risk factors     Problem: ABCDS Injury Assessment  Goal: Absence of physical injury  5/27/2024 2027 by Marilou Cunha RN  Outcome: Progressing  Flowsheets (Taken 5/27/2024 1948)  Absence of Physical Injury: Implement safety measures based on patient assessment     Problem: Chronic Conditions and Co-morbidities  Goal: Patient's chronic conditions and co-morbidity symptoms are monitored and maintained or improved  5/27/2024 2027 by Marilou Cunha RN  Outcome: Progressing  Flowsheets (Taken 5/27/2024 1948)  Care Plan - Patient's Chronic Conditions and Co-Morbidity Symptoms are Monitored and Maintained or Improved:   Monitor and assess patient's chronic conditions and 
  Problem: Discharge Planning  Goal: Discharge to home or other facility with appropriate resources  5/28/2024 0847 by Mercedes Walls RN  Outcome: Progressing  Flowsheets (Taken 5/28/2024 0847)  Discharge to home or other facility with appropriate resources:   Identify barriers to discharge with patient and caregiver   Identify discharge learning needs (meds, wound care, etc)   Arrange for needed discharge resources and transportation as appropriate  5/27/2024 2027 by Marilou Cunha, RN  Outcome: Progressing  Flowsheets (Taken 5/27/2024 1948)  Discharge to home or other facility with appropriate resources:   Identify barriers to discharge with patient and caregiver   Arrange for needed discharge resources and transportation as appropriate   Identify discharge learning needs (meds, wound care, etc)   Arrange for interpreters to assist at discharge as needed   Refer to discharge planning if patient needs post-hospital services based on physician order or complex needs related to functional status, cognitive ability or social support system     Problem: Pain  Goal: Verbalizes/displays adequate comfort level or baseline comfort level  5/28/2024 0847 by Mercedes Walls RN  Outcome: Progressing  Flowsheets (Taken 5/28/2024 0847)  Verbalizes/displays adequate comfort level or baseline comfort level:   Encourage patient to monitor pain and request assistance   Administer analgesics based on type and severity of pain and evaluate response   Assess pain using appropriate pain scale   Implement non-pharmacological measures as appropriate and evaluate response  5/27/2024 2027 by Marilou Cunha, RN  Outcome: Progressing     Problem: Safety - Adult  Goal: Free from fall injury  5/28/2024 0847 by Mercedes Walls RN  Outcome: Progressing  Flowsheets (Taken 5/28/2024 0847)  Free From Fall Injury: Instruct family/caregiver on patient safety  5/27/2024 2027 by Marilou Cunha, RN  Outcome: 
  Problem: Discharge Planning  Goal: Discharge to home or other facility with appropriate resources  Outcome: Completed     Problem: Pain  Goal: Verbalizes/displays adequate comfort level or baseline comfort level  Outcome: Completed     Problem: Safety - Adult  Goal: Free from fall injury  Outcome: Completed     Problem: ABCDS Injury Assessment  Goal: Absence of physical injury  Outcome: Completed     Problem: Chronic Conditions and Co-morbidities  Goal: Patient's chronic conditions and co-morbidity symptoms are monitored and maintained or improved  Outcome: Completed     Problem: Skin/Tissue Integrity  Goal: Absence of new skin breakdown  Description: 1.  Monitor for areas of redness and/or skin breakdown  2.  Assess vascular access sites hourly  3.  Every 4-6 hours minimum:  Change oxygen saturation probe site  4.  Every 4-6 hours:  If on nasal continuous positive airway pressure, respiratory therapy assess nares and determine need for appliance change or resting period.  Outcome: Completed     Problem: Neurosensory - Adult  Goal: Achieves stable or improved neurological status  Outcome: Completed     Problem: Skin/Tissue Integrity - Adult  Goal: Skin integrity remains intact  Outcome: Completed     Problem: Musculoskeletal - Adult  Goal: Return mobility to safest level of function  Outcome: Completed     Problem: Genitourinary - Adult  Goal: Absence of urinary retention  Outcome: Completed     Problem: Metabolic/Fluid and Electrolytes - Adult  Goal: Electrolytes maintained within normal limits  Outcome: Completed     
  Problem: Discharge Planning  Goal: Discharge to home or other facility with appropriate resources  Outcome: Progressing  Flowsheets (Taken 5/24/2024 2312)  Discharge to home or other facility with appropriate resources:   Identify barriers to discharge with patient and caregiver   Arrange for needed discharge resources and transportation as appropriate   Refer to discharge planning if patient needs post-hospital services based on physician order or complex needs related to functional status, cognitive ability or social support system   Identify discharge learning needs (meds, wound care, etc)     Problem: Safety - Adult  Goal: Free from fall injury  Outcome: Progressing     Problem: ABCDS Injury Assessment  Goal: Absence of physical injury  Outcome: Progressing     Problem: Pain  Goal: Verbalizes/displays adequate comfort level or baseline comfort level  Outcome: Not Progressing     
Increase patients ADLs/functional status to baseline.   
[facility]  Consult Note          Orthopedic Consult, full note to follow in am.    Mary Bolivar 83 y.o. admitted for a fall with right hip pain..    Xray reviewed, and showed displaced right femoral neck racture.    Plan:  - Recommend right hip hemiarthroplasty.  - Surgery tomorrow 8:00 AM    Thank you very much for the kind consultation and allowing me to participate in this patient's care.  I will continue to keep you apprised of her progress.         Jona Alvarez MD, 5/24/2024 9:49 PM          
Physical Injury: Implement safety measures based on patient assessment  5/28/2024 0847 by Mercedes Walls RN  Outcome: Progressing  Flowsheets (Taken 5/28/2024 0847)  Absence of Physical Injury: Implement safety measures based on patient assessment     Problem: Chronic Conditions and Co-morbidities  Goal: Patient's chronic conditions and co-morbidity symptoms are monitored and maintained or improved  5/28/2024 1954 by Mercedes Menon, RN  Outcome: Progressing  Flowsheets (Taken 5/28/2024 1947)  Care Plan - Patient's Chronic Conditions and Co-Morbidity Symptoms are Monitored and Maintained or Improved: Monitor and assess patient's chronic conditions and comorbid symptoms for stability, deterioration, or improvement  5/28/2024 0847 by Mercedes Walls RN  Outcome: Progressing  Flowsheets (Taken 5/28/2024 0847)  Care Plan - Patient's Chronic Conditions and Co-Morbidity Symptoms are Monitored and Maintained or Improved:   Monitor and assess patient's chronic conditions and comorbid symptoms for stability, deterioration, or improvement   Collaborate with multidisciplinary team to address chronic and comorbid conditions and prevent exacerbation or deterioration   Update acute care plan with appropriate goals if chronic or comorbid symptoms are exacerbated and prevent overall improvement and discharge     Problem: Skin/Tissue Integrity  Goal: Absence of new skin breakdown  Description: 1.  Monitor for areas of redness and/or skin breakdown  2.  Assess vascular access sites hourly  3.  Every 4-6 hours minimum:  Change oxygen saturation probe site  4.  Every 4-6 hours:  If on nasal continuous positive airway pressure, respiratory therapy assess nares and determine need for appliance change or resting period.  5/28/2024 1954 by Mercedes Menon, RN  Outcome: Progressing  Note: Assist pt. With turning q2 and prn.  5/28/2024 0847 by Mercedes Walls, RN  Outcome: Progressing     Problem: Neurosensory - Adult  Goal: Achieves stable 
exacerbated and prevent overall improvement and discharge     Problem: Skin/Tissue Integrity  Goal: Absence of new skin breakdown  Description: 1.  Monitor for areas of redness and/or skin breakdown  2.  Assess vascular access sites hourly  3.  Every 4-6 hours minimum:  Change oxygen saturation probe site  4.  Every 4-6 hours:  If on nasal continuous positive airway pressure, respiratory therapy assess nares and determine need for appliance change or resting period.  Outcome: Progressing

## 2024-05-29 NOTE — CARE COORDINATION
Discharge Note     Discharge order received.      Destination: [facility]   [address], [place], [place] [id number] [phone]      Transportation: The S Vehicle you requested for Mary NUNESGillian in unit/room [id number] on 05/29/2024 is scheduled to arrive at 5:00pm EDT! [facility] is handling this ride and you can contact them at [phone].     All case management needs met.    [facility]   [address], [place] [address] [phone]

## 2024-05-30 LAB — SURGICAL PATHOLOGY REPORT: NORMAL

## 2024-06-03 LAB
EKG ATRIAL RATE: 69 BPM
EKG DIAGNOSIS: NORMAL
EKG P AXIS: 62 DEGREES
EKG P-R INTERVAL: 182 MS
EKG Q-T INTERVAL: 424 MS
EKG QRS DURATION: 114 MS
EKG QTC CALCULATION (BAZETT): 454 MS
EKG R AXIS: -30 DEGREES
EKG T AXIS: 66 DEGREES
EKG VENTRICULAR RATE: 69 BPM

## 2024-06-05 ENCOUNTER — OFFICE VISIT (OUTPATIENT)
Dept: ORTHOPEDIC SURGERY | Age: 84
End: 2024-06-05

## 2024-06-05 VITALS — WEIGHT: 156.75 LBS | HEIGHT: 63 IN | BODY MASS INDEX: 27.77 KG/M2

## 2024-06-05 DIAGNOSIS — S72.001D CLOSED FRACTURE OF NECK OF RIGHT FEMUR WITH ROUTINE HEALING, SUBSEQUENT ENCOUNTER: Primary | Chronic | ICD-10-CM

## 2024-06-05 PROCEDURE — 99024 POSTOP FOLLOW-UP VISIT: CPT | Performed by: NURSE PRACTITIONER

## 2024-06-05 PROCEDURE — APPNB15 APP NON BILLABLE TIME 0-15 MINS: Performed by: NURSE PRACTITIONER

## 2024-06-05 NOTE — PROGRESS NOTES
DIAGNOSIS:  Right femoral neck fracture, status post Press-Fit bipolar hemiarthroplasty.    DATE OF SURGERY:  5/25/2024  .    HISTORY OF PRESENT ILLNESS:  Ms. Bolivar 83 y.o.  female who came in today for 2 weeks postoperative visit.  The patient denies any significant pain in the right hip. Rates pain a 2/10 VAS mild, aching, intermittent and are improving. Aggravating factors walking. Alleviating factors rest.  She  has been walking weightbearing as tolerated using a walker. She is in rehab working with PT. Normally she lives with her son.  No numbness or tingling sensation. No fever or Chills.    PHYSICAL EXAMINATION:  The incision is completely healed right hip. No signs of any erythema or drainage.  She has no pain with the active or passive range of motion of the right hip.  She has intact sensation, distally, and she is neurovascularly intact.    IMAGING:  X-rays were taken in the office today, AP pelvis and 2 views of the right hip and femur, and showed the Press-Fit hemiarthroplasty in good position.  No signs of any lucency.    IMPRESSION:  2 weeks out from right hip hemiarthroplasty and doing very well.    PLAN:  I have told the patient to continue PT, weightbearing as tolerated, as well as strengthening exercises.  The patient will come back for a follow up in 6 weeks.  At that time, we will take AP pelvis and 2 views of the affected hip.    As this patient has demonstrated risk factors for osteoporosis, such as age greater than fifty years and evidence of a fracture, I have referred the patient back to the primary care physician for evaluation for osteoporosis, including consideration for DEXA scanning, if this is felt to be clinically indicated.  The patient is advised to contact the primary care physician to follow-up for further evaluation.       Jeanine Shields, LORI - CNP

## 2024-07-18 ENCOUNTER — OFFICE VISIT (OUTPATIENT)
Dept: ORTHOPEDIC SURGERY | Age: 84
End: 2024-07-18

## 2024-07-18 VITALS — BODY MASS INDEX: 28.71 KG/M2 | HEIGHT: 62 IN | WEIGHT: 156 LBS

## 2024-07-18 DIAGNOSIS — S72.001D CLOSED FRACTURE OF NECK OF RIGHT FEMUR WITH ROUTINE HEALING, SUBSEQUENT ENCOUNTER: Primary | ICD-10-CM

## 2024-07-18 PROCEDURE — 99024 POSTOP FOLLOW-UP VISIT: CPT | Performed by: NURSE PRACTITIONER

## 2024-07-18 PROCEDURE — APPNB15 APP NON BILLABLE TIME 0-15 MINS: Performed by: NURSE PRACTITIONER

## 2024-07-20 NOTE — PROGRESS NOTES
DIAGNOSIS:  Right femoral neck fracture, status post Press-Fit bipolar hemiarthroplasty.    DATE OF SURGERY:  5/25/2024  .    HISTORY OF PRESENT ILLNESS:  Ms. Bolivar 83 y.o.  female who came in today for 8 weeks postoperative visit.  The patient denies any significant pain in the right hip. Rates pain a 0/10 VAS and is doing better.  She  has been walking weightbearing as tolerated using a walker. She is working with PT with improvement. Normally she lives with her son.  No numbness or tingling sensation. No fever or Chills.    PHYSICAL EXAMINATION:  The incision is completely healed right hip. No signs of any erythema or drainage.  She has no pain with the active or passive range of motion of the right hip.  She has intact sensation, distally, and she is neurovascularly intact.    IMAGING:  X-rays were taken in the office today, AP pelvis and 2 views of the right hip and femur, and showed the Press-Fit hemiarthroplasty in good position.  No signs of any lucency.    IMPRESSION:  8 weeks out from right hip hemiarthroplasty and doing very well.    PLAN:  I have told the patient to continue PT, weightbearing as tolerated, as well as strengthening exercises.  The patient will come back for a follow up in 6 weeks.  At that time, we will take AP pelvis and 2 views of the affected hip.    As this patient has demonstrated risk factors for osteoporosis, such as age greater than fifty years and evidence of a fracture, I have referred the patient back to the primary care physician for evaluation for osteoporosis, including consideration for DEXA scanning, if this is felt to be clinically indicated.  The patient is advised to contact the primary care physician to follow-up for further evaluation.       Jeanine Shields, LORI - CNP

## 2024-08-29 ENCOUNTER — OFFICE VISIT (OUTPATIENT)
Dept: ORTHOPEDIC SURGERY | Age: 84
End: 2024-08-29
Payer: MEDICARE

## 2024-08-29 VITALS — WEIGHT: 156.09 LBS | HEIGHT: 62 IN | BODY MASS INDEX: 28.72 KG/M2

## 2024-08-29 DIAGNOSIS — G89.29 CHRONIC PAIN OF RIGHT KNEE: Primary | ICD-10-CM

## 2024-08-29 DIAGNOSIS — M25.561 CHRONIC PAIN OF RIGHT KNEE: Primary | ICD-10-CM

## 2024-08-29 DIAGNOSIS — M17.11 PRIMARY OSTEOARTHRITIS OF RIGHT KNEE: ICD-10-CM

## 2024-08-29 DIAGNOSIS — S72.001D CLOSED FRACTURE OF NECK OF RIGHT FEMUR WITH ROUTINE HEALING, SUBSEQUENT ENCOUNTER: ICD-10-CM

## 2024-08-29 PROCEDURE — 20610 DRAIN/INJ JOINT/BURSA W/O US: CPT | Performed by: ORTHOPAEDIC SURGERY

## 2024-08-29 PROCEDURE — 1123F ACP DISCUSS/DSCN MKR DOCD: CPT | Performed by: ORTHOPAEDIC SURGERY

## 2024-08-29 PROCEDURE — 99214 OFFICE O/P EST MOD 30 MIN: CPT | Performed by: ORTHOPAEDIC SURGERY

## 2024-08-30 PROBLEM — M17.11 PRIMARY OSTEOARTHRITIS OF RIGHT KNEE: Status: ACTIVE | Noted: 2024-08-30

## 2024-08-30 NOTE — PROGRESS NOTES
CHIEF COMPLAINT:   1- Right knee pain/osteoarthritis.  2- Right femoral neck fracture, status post Press-Fit bipolar hemiarthroplasty, 2024.  .  HISTORY:  Ms. Bolivar 83 y.o.  female presents today for 3 months postoperative visit.  The patient denies any significant pain in the right hip. Rates pain a 2/10 VAS and is doing better. She has been walking weightbearing as tolerated using a walker. She had HHPT with improvement. She lives with her son.  No numbness or tingling sensation. No fever or Chills.    She also came for evaluation of right knee pain which started years ago. She had Synvisc injection in another practice with good improvement in the past.  She is complaining of sharp pain.  Pain is increase with standing and walking and decrease with rest. Pain is sharp early in the morning with first few steps, dull achy pain by the end of the day. Alleviating factors: rest. No radiation and no numbness and tingling sensation. No other complaint.  No h/o gout.    Past Medical History:   Diagnosis Date    Breast cancer (HCC) 2022    Right    Cancer (HCC) 2022    right breast    COVID-2021    Diabetes mellitus (HCC)     Hx of blood clots     in legs- on Coumadin    Hyperlipidemia     Hypertension     Nervous breakdown       - Dec 21    Pneumothorax     during surgery - in        Past Surgical History:   Procedure Laterality Date    BREAST BIOPSY Right 2022    RIGHT BREAST LUMPECTOMY/ RIGHT BREAST SENTINEL NODE BIOPSY performed by Chata Torres MD at [facility] OR    BREAST SURGERY Right 2022    RE-EXCISION RIGHT BREAST LUMPECTOMY performed by Chata Torres MD at [facility] OR    CHOLECYSTECTOMY      EYE SURGERY      cataract with IOL    HIP SURGERY Right 2024    HIP HEMIARTHROPLASTY performed by Jona Alvarez MD at [facility] OR    HYSTERECTOMY (CERVIX STATUS UNKNOWN)      US BREAST BIOPSY W LOC DEVICE 1ST LESION RIGHT Right 2022    US BREAST NEEDLE BIOPSY

## 2024-12-03 ENCOUNTER — OFFICE VISIT (OUTPATIENT)
Dept: ORTHOPEDIC SURGERY | Age: 84
End: 2024-12-03
Payer: MEDICARE

## 2024-12-03 VITALS — WEIGHT: 156 LBS | BODY MASS INDEX: 28.71 KG/M2 | HEIGHT: 62 IN

## 2024-12-03 DIAGNOSIS — S72.001D CLOSED FRACTURE OF NECK OF RIGHT FEMUR WITH ROUTINE HEALING, SUBSEQUENT ENCOUNTER: Primary | ICD-10-CM

## 2024-12-03 DIAGNOSIS — M17.11 PRIMARY OSTEOARTHRITIS OF RIGHT KNEE: ICD-10-CM

## 2024-12-03 PROCEDURE — 1123F ACP DISCUSS/DSCN MKR DOCD: CPT | Performed by: ORTHOPAEDIC SURGERY

## 2024-12-03 PROCEDURE — 99214 OFFICE O/P EST MOD 30 MIN: CPT | Performed by: ORTHOPAEDIC SURGERY

## 2024-12-03 PROCEDURE — 1159F MED LIST DOCD IN RCRD: CPT | Performed by: ORTHOPAEDIC SURGERY

## 2024-12-03 NOTE — PROGRESS NOTES
CHIEF COMPLAINT:   1- Right knee pain/osteoarthritis.  2- Right femoral neck fracture, status post Press-Fit bipolar hemiarthroplasty, 2024.  .  HISTORY:  Ms. Bolivar 84 y.o.  female presents today for 6 months postoperative visit.  The patient denies any significant pain in the right hip. Rates pain a 0/10 VAS and is doing better. She has been walking weightbearing as tolerated using a walker. She had HHPT with improvement. She lives with her son.  No numbness or tingling sensation. No fever or Chills.    She also came for f/u evaluation of right knee pain which started years ago. She had Duralane injection right knee on 2024 with good improvement.  She is complaining of sharp pain, 7/10.  Pain is increase with standing and walking and decrease with rest. Pain is achy early in the morning with first few steps, dull achy pain by the end of the day. Alleviating factors: rest. No radiation and no numbness and tingling sensation. No other complaint.  No h/o gout.    Past Medical History:   Diagnosis Date    Breast cancer (HCC) 2022    Right    Cancer (HCC) 2022    right breast    COVID-2021    Diabetes mellitus (HCC)     Hx of blood clots     in legs- on Coumadin    Hyperlipidemia     Hypertension     Nervous breakdown       - Dec 21    Pneumothorax     during surgery - in        Past Surgical History:   Procedure Laterality Date    BREAST BIOPSY Right 2022    RIGHT BREAST LUMPECTOMY/ RIGHT BREAST SENTINEL NODE BIOPSY performed by Chata Torres MD at [facility] OR    BREAST SURGERY Right 2022    RE-EXCISION RIGHT BREAST LUMPECTOMY performed by Chata Torres MD at [facility] OR    CHOLECYSTECTOMY      EYE SURGERY      cataract with IOL    HIP SURGERY Right 2024    HIP HEMIARTHROPLASTY performed by Jona Alvarez MD at [facility] OR    HYSTERECTOMY (CERVIX STATUS UNKNOWN)      US BREAST BIOPSY W LOC DEVICE 1ST LESION RIGHT Right 2022    US BREAST NEEDLE BIOPSY

## 2025-07-08 ENCOUNTER — OFFICE VISIT (OUTPATIENT)
Dept: ORTHOPEDIC SURGERY | Age: 85
End: 2025-07-08
Payer: MEDICARE

## 2025-07-08 VITALS — RESPIRATION RATE: 16 BRPM | BODY MASS INDEX: 28.71 KG/M2 | HEIGHT: 62 IN | WEIGHT: 156 LBS

## 2025-07-08 DIAGNOSIS — M17.11 PRIMARY OSTEOARTHRITIS OF RIGHT KNEE: ICD-10-CM

## 2025-07-08 DIAGNOSIS — S72.001D CLOSED FRACTURE OF NECK OF RIGHT FEMUR WITH ROUTINE HEALING, SUBSEQUENT ENCOUNTER: Primary | ICD-10-CM

## 2025-07-08 PROCEDURE — 20610 DRAIN/INJ JOINT/BURSA W/O US: CPT | Performed by: ORTHOPAEDIC SURGERY

## 2025-07-08 PROCEDURE — 1159F MED LIST DOCD IN RCRD: CPT | Performed by: ORTHOPAEDIC SURGERY

## 2025-07-08 PROCEDURE — 1126F AMNT PAIN NOTED NONE PRSNT: CPT | Performed by: ORTHOPAEDIC SURGERY

## 2025-07-08 PROCEDURE — 99214 OFFICE O/P EST MOD 30 MIN: CPT | Performed by: ORTHOPAEDIC SURGERY

## 2025-07-08 PROCEDURE — 1123F ACP DISCUSS/DSCN MKR DOCD: CPT | Performed by: ORTHOPAEDIC SURGERY

## 2025-07-08 NOTE — PROGRESS NOTES
CHIEF COMPLAINT:   1- Right knee pain/osteoarthritis.  2- Right femoral neck fracture, status post Press-Fit bipolar hemiarthroplasty, 2024.  .  HISTORY:  Ms. Bolivar 84 y.o.  female presents today for 1 year postoperative visit.  The patient denies any significant pain in the right hip. Rates pain a 0/10 VAS and is doing better. She has been walking weightbearing as tolerated using a walker. She completed HHPT with improvement. She lives with her son.  No numbness or tingling sensation. No fever or Chills.    She also came for f/u evaluation of right knee pain which started years ago. She had Duralane injection right knee on 2024 with good improvement.  She is complaining of sharp pain, 7/10.  Pain is increase with standing and walking and decrease with rest. Pain is achy early in the morning with first few steps, dull achy pain by the end of the day. Alleviating factors: rest. No radiation and no numbness and tingling sensation. No other complaint.  No h/o gout.    Past Medical History:   Diagnosis Date    Breast cancer (HCC) 2022    Right    Cancer (HCC) 2022    right breast    COVID-2021    Diabetes mellitus (HCC)     Hx of blood clots     in legs- on Coumadin    Hyperlipidemia     Hypertension     Nervous breakdown       - Dec 21    Pneumothorax     during surgery - in        Past Surgical History:   Procedure Laterality Date    BREAST BIOPSY Right 2022    RIGHT BREAST LUMPECTOMY/ RIGHT BREAST SENTINEL NODE BIOPSY performed by Chata Torres MD at [facility] OR    BREAST SURGERY Right 2022    RE-EXCISION RIGHT BREAST LUMPECTOMY performed by Chata Torres MD at [facility] OR    CHOLECYSTECTOMY      EYE SURGERY      cataract with IOL    HIP SURGERY Right 2024    HIP HEMIARTHROPLASTY performed by Jona Alvarez MD at [facility] OR    HYSTERECTOMY (CERVIX STATUS UNKNOWN)      US BREAST BIOPSY W LOC DEVICE 1ST LESION RIGHT Right 2022    US BREAST NEEDLE

## 2025-08-11 ENCOUNTER — OFFICE VISIT (OUTPATIENT)
Dept: ORTHOPEDIC SURGERY | Age: 85
End: 2025-08-11
Payer: MEDICARE

## 2025-08-11 DIAGNOSIS — M25.561 RIGHT KNEE PAIN, UNSPECIFIED CHRONICITY: Primary | ICD-10-CM

## 2025-08-11 PROBLEM — M17.11 OSTEOARTHRITIS OF RIGHT KNEE: Status: ACTIVE | Noted: 2025-08-11

## 2025-08-11 PROCEDURE — 99214 OFFICE O/P EST MOD 30 MIN: CPT | Performed by: ORTHOPAEDIC SURGERY

## 2025-08-11 PROCEDURE — 1123F ACP DISCUSS/DSCN MKR DOCD: CPT | Performed by: ORTHOPAEDIC SURGERY

## 2025-08-11 PROCEDURE — 1159F MED LIST DOCD IN RCRD: CPT | Performed by: ORTHOPAEDIC SURGERY

## 2025-09-03 ENCOUNTER — HOSPITAL ENCOUNTER (OUTPATIENT)
Age: 85
Discharge: HOME | End: 2025-09-03
Payer: MEDICARE

## 2025-09-03 DIAGNOSIS — Z01.818 PRE-OP TESTING: ICD-10-CM

## 2025-09-03 LAB
ALBUMIN SERPL-MCNC: 3.9 G/DL (ref 3.4–5)
ALBUMIN/GLOB SERPL: 1.3 {RATIO} (ref 1.1–2.2)
ALP SERPL-CCNC: 76 U/L (ref 40–129)
ALT SERPL-CCNC: 8 U/L (ref 10–40)
ANION GAP SERPL CALCULATED.3IONS-SCNC: 11 MMOL/L (ref 3–16)
APTT BLD: 34.1 SEC (ref 22.8–35.8)
AST SERPL-CCNC: 14 U/L (ref 15–37)
BASOPHILS # BLD: 0 K/UL (ref 0–0.2)
BASOPHILS NFR BLD: 1 %
BILIRUB SERPL-MCNC: 0.4 MG/DL (ref 0–1)
BUN SERPL-MCNC: 21 MG/DL (ref 7–20)
CALCIUM SERPL-MCNC: 9 MG/DL (ref 8.3–10.6)
CHLORIDE SERPL-SCNC: 103 MMOL/L (ref 99–110)
CO2 SERPL-SCNC: 26 MMOL/L (ref 21–32)
CREAT SERPL-MCNC: 1.1 MG/DL (ref 0.6–1.2)
DEPRECATED RDW RBC AUTO: 18.4 % (ref 12.4–15.4)
EOSINOPHIL # BLD: 0 K/UL (ref 0–0.6)
EOSINOPHIL NFR BLD: 0.4 %
EST. AVERAGE GLUCOSE BLD GHB EST-MCNC: 108.3 MG/DL
GFR SERPLBLD CREATININE-BSD FMLA CKD-EPI: 49 ML/MIN/{1.73_M2}
GLUCOSE SERPL-MCNC: 121 MG/DL (ref 70–99)
HBA1C MFR BLD: 5.4 %
HCT VFR BLD AUTO: 34.8 % (ref 36–48)
HGB BLD-MCNC: 11.7 G/DL (ref 12–16)
INR PPP: 1.59 (ref 0.86–1.14)
LYMPHOCYTES # BLD: 1.2 K/UL (ref 1–5.1)
LYMPHOCYTES NFR BLD: 36.4 %
MCH RBC QN AUTO: 26.7 PG (ref 26–34)
MCHC RBC AUTO-ENTMCNC: 33.7 G/DL (ref 31–36)
MCV RBC AUTO: 79.4 FL (ref 80–100)
MONOCYTES # BLD: 0.4 K/UL (ref 0–1.3)
MONOCYTES NFR BLD: 12.7 %
NEUTROPHILS # BLD: 1.6 K/UL (ref 1.7–7.7)
NEUTROPHILS NFR BLD: 49.5 %
PLATELET # BLD AUTO: 162 K/UL (ref 135–450)
PMV BLD AUTO: 9.7 FL (ref 5–10.5)
POTASSIUM SERPL-SCNC: 4.5 MMOL/L (ref 3.5–5.1)
PROT SERPL-MCNC: 7 G/DL (ref 6.4–8.2)
PROTHROMBIN TIME: 19 SEC (ref 12.1–14.9)
RBC # BLD AUTO: 4.38 M/UL (ref 4–5.2)
SODIUM SERPL-SCNC: 140 MMOL/L (ref 136–145)
WBC # BLD AUTO: 3.2 K/UL (ref 4–11)

## 2025-09-03 PROCEDURE — 93005 ELECTROCARDIOGRAM TRACING: CPT

## 2025-09-03 PROCEDURE — 36415 COLL VENOUS BLD VENIPUNCTURE: CPT

## 2025-09-03 PROCEDURE — 83036 HEMOGLOBIN GLYCOSYLATED A1C: CPT

## 2025-09-03 PROCEDURE — 85730 THROMBOPLASTIN TIME PARTIAL: CPT

## 2025-09-03 PROCEDURE — 80053 COMPREHEN METABOLIC PANEL: CPT

## 2025-09-03 PROCEDURE — 85025 COMPLETE CBC W/AUTO DIFF WBC: CPT

## 2025-09-03 PROCEDURE — 85610 PROTHROMBIN TIME: CPT

## 2025-09-03 PROCEDURE — 87081 CULTURE SCREEN ONLY: CPT

## 2025-09-04 LAB
EKG ATRIAL RATE: 61 BPM
EKG DIAGNOSIS: NORMAL
EKG P AXIS: 53 DEGREES
EKG P-R INTERVAL: 192 MS
EKG Q-T INTERVAL: 444 MS
EKG QRS DURATION: 108 MS
EKG QTC CALCULATION (BAZETT): 446 MS
EKG R AXIS: -28 DEGREES
EKG T AXIS: 57 DEGREES
EKG VENTRICULAR RATE: 61 BPM

## 2025-09-05 LAB — MRSA SPEC QL CULT: NORMAL

## (undated) DEVICE — APPLICATOR MEDICATED 26 CC SOLUTION HI LT ORNG CHLORAPREP

## (undated) DEVICE — HANDPIECE SET WITH HIGH FLOW TIP AND SUCTION TUBE: Brand: INTERPULSE

## (undated) DEVICE — 3M™ TEGADERM™ TRANSPARENT FILM DRESSING FRAME STYLE, 1624W, 2-3/8 IN X 2-3/4 IN (6 CM X 7 CM), 100/CT 4CT/CASE: Brand: 3M™ TEGADERM™

## (undated) DEVICE — SUTURE VICRYL + SZ 2-0 L18IN ABSRB UD CT1 L36MM 1/2 CIR VCP839D

## (undated) DEVICE — GLOVE,SURG,SENSICARE,ALOE,LF,PF,7: Brand: MEDLINE

## (undated) DEVICE — RESERVOIR,SUCTION,100CC,SILICONE: Brand: MEDLINE

## (undated) DEVICE — CLIP LIG M BLU TI HRT SHP WIRE HORZ 600 PER BX

## (undated) DEVICE — SUTURE VICRYL + 1 L27IN ABSRB UD CT-1 L36MM 1/2 CIR TAPR PNT VCP261H

## (undated) DEVICE — MINOR BREAST: Brand: MEDLINE INDUSTRIES, INC.

## (undated) DEVICE — GLOVE ORTHO 8   MSG9480

## (undated) DEVICE — BLANKET WRM W40.2XL55.9IN IORT LO BODY + MISTRAL AIR

## (undated) DEVICE — 3M™ IOBAN™ 2 ANTIMICROBIAL INCISE DRAPE 6650EZ: Brand: IOBAN™ 2

## (undated) DEVICE — DRAPE,CHEST,FENES,15X10,STERIL: Brand: MEDLINE

## (undated) DEVICE — SUTURE VCRL SZ 3-0 L27IN ABSRB UD L26MM SH 1/2 CIR J416H

## (undated) DEVICE — SHEET,DRAPE,40X58,STERILE: Brand: MEDLINE

## (undated) DEVICE — DRAPE,U/ SHT,SPLIT,PLAS,STERIL: Brand: MEDLINE

## (undated) DEVICE — GOWN,SURGICAL,AURORA,SLEEVE: Brand: MEDLINE

## (undated) DEVICE — SUTURE ABSORBABLE MONOFILAMENT 1 OS-8 36 CM 40 MM VIO PDS +

## (undated) DEVICE — GOWN,SIRUS,POLYRNF,BRTHSLV,LG,30/CS: Brand: MEDLINE

## (undated) DEVICE — SUTURE VICRYL + SZ 0 L27IN ABSRB UD CT-1 L36MM 1/2 CIR TAPR VCP260H

## (undated) DEVICE — ELECTRODE PT RET AD L9FT HI MOIST COND ADH HYDRGEL CORDED

## (undated) DEVICE — SUTURE ETHIBOND EXCEL SZ 5 L30IN NONABSORBABLE GRN L40MM V-37 MB66G

## (undated) DEVICE — BANDAGE COBAN 6 IN WND 6INX5YD FOAM

## (undated) DEVICE — PROVE COVER: Brand: UNBRANDED

## (undated) DEVICE — SUTURE ETHIBOND EXCEL SZ 2 L30IN NONABSORBABLE GRN L40MM V-37 MX69G

## (undated) DEVICE — TOWEL,STOP FLAG GOLD N-W: Brand: MEDLINE

## (undated) DEVICE — DRESSING CNTCT 4X12IN IS THERABOND 3D

## (undated) DEVICE — 2108 SERIES SAGITTAL BLADE (24.8 X 0.88 X 73.8MM)

## (undated) DEVICE — GLOVE SURG SZ 7 L12IN FNGR THK79MIL GRN LTX FREE

## (undated) DEVICE — SUTURE MONOCRYL + SZ 4-0 L18IN ABSRB UD L19MM PS-2 3/8 CIR MCP496G

## (undated) DEVICE — CLEANER,CAUTERY TIP,2X2",STERILE: Brand: MEDLINE

## (undated) DEVICE — SYRINGE MED 30ML STD CLR PLAS LUERLOCK TIP N CTRL DISP

## (undated) DEVICE — STERILE LATEX POWDER-FREE SURGICAL GLOVESWITH NITRILE COATING: Brand: PROTEXIS

## (undated) DEVICE — DRAIN,WOUND,15FR,3/16,FULL-FLUTED: Brand: MEDLINE

## (undated) DEVICE — TOTAL HIP PK

## (undated) DEVICE — SUTURE PERMA-HAND SZ 2-0 L30IN NONABSORBABLE BLK L30MM FSL 679H

## (undated) DEVICE — COVER,MAYO STAND,XL,STERILE: Brand: MEDLINE

## (undated) DEVICE — STRIP,CLOSURE,WOUND,MEDI-STRIP,1/2X4: Brand: MEDLINE

## (undated) DEVICE — SUTURE MCRYL SZ 4-0 L27IN ABSRB UD L19MM PS-2 1/2 CIR PRIM Y426H

## (undated) DEVICE — GAUZE,SPONGE,4"X4",16PLY,XRAY,STRL,LF: Brand: MEDLINE

## (undated) DEVICE — DRESSING GERM DIA1IN CNTR H DIA7MM BLU CHG ANTIMIC PROTCT

## (undated) DEVICE — INTENDED USE FOR SURGICAL MARKING ON INTACT SKIN, ALSO PROVIDES A PERMANENT METHOD OF IDENTIFYING OBJECTS IN THE OPERATING ROOM: Brand: WRITESITE® PLUS MINI PREP RESISTANT MARKER

## (undated) DEVICE — SUTURE VICRYL + SZ 0 L27IN ABSRB UD L36MM CT-1 1/2 CIR VCPP41D

## (undated) DEVICE — STOCKINETTE,IMPERVIOUS,12X48,STERILE: Brand: MEDLINE

## (undated) DEVICE — SYRINGE, LUER LOCK, 10ML: Brand: MEDLINE

## (undated) DEVICE — SUTURE VICRYL + SZ 0 L18IN ABSRB UD L36MM CT-1 1/2 CIR VCP840D

## (undated) DEVICE — HYPODERMIC SAFETY NEEDLE: Brand: MAGELLAN

## (undated) DEVICE — SPECIMEN ORIENTATION CHARMS, SIX DISTINCTLY SHAPED STERILE 10MM CHARMS: Brand: MARGINMAP

## (undated) DEVICE — SUTURE VICRYL + SZ 3-0 L18IN ABSRB UD SH 1/2 CIR TAPERCUT NDL VCP864D

## (undated) DEVICE — DRAPE,T,LAPARO,TRANS,STERILE: Brand: MEDLINE